# Patient Record
Sex: FEMALE | Race: WHITE | NOT HISPANIC OR LATINO | Employment: OTHER | URBAN - METROPOLITAN AREA
[De-identification: names, ages, dates, MRNs, and addresses within clinical notes are randomized per-mention and may not be internally consistent; named-entity substitution may affect disease eponyms.]

---

## 2001-08-10 LAB — HM PAP SMEAR: NORMAL

## 2017-01-20 ENCOUNTER — OFFICE VISIT (OUTPATIENT)
Dept: OBSTETRICS AND GYNECOLOGY | Facility: CLINIC | Age: 52
End: 2017-01-20

## 2017-01-20 VITALS
DIASTOLIC BLOOD PRESSURE: 60 MMHG | BODY MASS INDEX: 33.46 KG/M2 | SYSTOLIC BLOOD PRESSURE: 120 MMHG | HEIGHT: 64 IN | WEIGHT: 196 LBS

## 2017-01-20 DIAGNOSIS — N76.4 ABSCESS, VULVA: Primary | ICD-10-CM

## 2017-01-20 DIAGNOSIS — N76.4 VULVAR ABSCESS: ICD-10-CM

## 2017-01-20 DIAGNOSIS — Z13.9 SCREENING: ICD-10-CM

## 2017-01-20 LAB
BILIRUB BLD-MCNC: NEGATIVE MG/DL
CLARITY, POC: CLEAR
COLOR UR: YELLOW
GLUCOSE UR STRIP-MCNC: NEGATIVE MG/DL
KETONES UR QL: NEGATIVE
LEUKOCYTE EST, POC: NEGATIVE
NITRITE UR-MCNC: NEGATIVE MG/ML
PH UR: 6 [PH] (ref 5–8)
PROT UR STRIP-MCNC: NEGATIVE MG/DL
RBC # UR STRIP: NEGATIVE /UL
SP GR UR: 1.01 (ref 1–1.03)
UROBILINOGEN UR QL: NORMAL

## 2017-01-20 PROCEDURE — 99213 OFFICE O/P EST LOW 20 MIN: CPT | Performed by: OBSTETRICS & GYNECOLOGY

## 2017-01-20 PROCEDURE — 81002 URINALYSIS NONAUTO W/O SCOPE: CPT | Performed by: OBSTETRICS & GYNECOLOGY

## 2017-01-20 PROCEDURE — 96372 THER/PROPH/DIAG INJ SC/IM: CPT | Performed by: OBSTETRICS & GYNECOLOGY

## 2017-01-20 RX ORDER — LISINOPRIL AND HYDROCHLOROTHIAZIDE 20; 12.5 MG/1; MG/1
TABLET ORAL
COMMUNITY
Start: 2016-11-11 | End: 2019-04-17

## 2017-01-20 RX ORDER — NORGESTIMATE AND ETHINYL ESTRADIOL 7DAYSX3 28
KIT ORAL
COMMUNITY
Start: 2016-12-27 | End: 2017-04-05

## 2017-01-20 RX ORDER — CEFTRIAXONE 1 G/1
1 INJECTION, POWDER, FOR SOLUTION INTRAMUSCULAR; INTRAVENOUS ONCE
Status: COMPLETED | OUTPATIENT
Start: 2017-01-20 | End: 2017-01-20

## 2017-01-20 RX ORDER — LEVOTHYROXINE SODIUM 88 UG/1
TABLET ORAL
COMMUNITY
Start: 2016-11-09

## 2017-01-20 RX ADMIN — CEFTRIAXONE 1 G: 1 INJECTION, POWDER, FOR SOLUTION INTRAMUSCULAR; INTRAVENOUS at 15:35

## 2017-01-20 NOTE — MR AVS SNAPSHOT
Sonja Dove   1/20/2017 2:30 PM   Office Visit    Dept Phone:  [phone]   Encounter #:  [id number]    Provider:  Hakan Olsen MD   Department:  [facility]                Your Full Care Plan              Today's Medication Changes          These changes are accurate as of: 1/20/17  3:34 PM.  If you have any questions, ask your nurse or doctor.               New Medication(s)Ordered:     dicloxacillin 500 MG capsule   Commonly known as:  DYNAPEN   Take 1 capsule by mouth 4 (Four) Times a Day for 7 days.   Started by:  Hakan Olsen MD            Where to Get Your Medications      These medications were sent to [address] - [address] [place] [address] AT  [address] - [phone]  - [phone] [place] [id number]     Phone:  [phone]     dicloxacillin 500 MG capsule                  Your Updated Medication List          This list is accurate as of: 1/20/17  3:34 PM.  Always use your most recent med list.                dicloxacillin 500 MG capsule   Commonly known as:  DYNAPEN   Take 1 capsule by mouth 4 (Four) Times a Day for 7 days.       levothyroxine 88 MCG tablet   Commonly known as:  SYNTHROID, LEVOTHROID       lisinopril-hydrochlorothiazide 20-12.5 MG per tablet   Commonly known as:  PRINZIDE,ZESTORETIC       TRI-SPRINTEC 0.18/0.215/0.25 MG-35 MCG per tablet   Generic drug:  norgestimate-ethinyl estradiol               We Performed the Following     Anaerobic culture     Culture, Routine     POC Urinalysis Dipstick       You Were Diagnosed With        Codes Comments    Abscess, vulva    -  Primary ICD-10-CM: N76.4  ICD-9-CM: 616.4     Screening     ICD-10-CM: Z13.9  ICD-9-CM: V82.9     Vulvar abscess     ICD-10-CM: N76.4  ICD-9-CM: 616.4       Instructions     None    Patient Instructions History      Upcoming Appointments     Visit Type Date Time Department    GYN FOLLOW UP 1/20/2017  2:30  "PM [facility] [place]    GYN FOLLOW UP 2017  1:45 PM [facility] [place]      MyChart Signup     [facility] [employer] allows you to send messages to your doctor, view your test results, renew your prescriptions, schedule appointments, and more. To sign up, go to [employer] and click on the Sign Up Now link in the New User? box. Enter your [employer] Activation Code exactly as it appears below along with the last four digits of your Social Security Number and your Date of Birth () to complete the sign-up process. If you do not sign up before the expiration date, you must request a new code.    [employer] Activation Code: [email or web address]  Expires: 2/3/2017  3:31 PM    If you have questions, you can email [email or web address] or call [phone] to talk to our [employer] staff. Remember, [employer] is NOT to be used for urgent needs. For medical emergencies, dial 911.               Other Info from Your Visit           Your Appointments     2017  1:45 PM EST   GYN FOLLOW UP with Hakan Olsen MD   [facility] (--)    [address], [facility] [address]  [facility] [address] [phone]              Allergies     No Known Allergies      Reason for Visit     Follow-up U/S RESULT      Vital Signs     Blood Pressure Height Weight Last Menstrual Period Breastfeeding? Body Mass Index    120/60 64\" (162.6 cm) 196 lb (88.9 kg) 2016 (Exact Date) No 33.64 kg/m2    Smoking Status                   Never Smoker           Problems and Diagnoses Noted     Abscess, vulva    Screening        Vulvar abscess          Results     POC Urinalysis Dipstick      Component Value Standard Range & Units    Color Yellow Yellow, Straw, Dark Yellow, Elizabeth    Clarity, UA Clear Clear    Glucose, UA Negative Negative, 1000 mg/dL (3+) mg/dL    Bilirubin Negative Negative    Ketones, UA Negative Negative    Specific Gravity  1.010 1.005 - 1.030    Blood, UA Negative Negative    pH, " Urine 6.0 5.0 - 8.0    Protein, POC Negative Negative mg/dL    Urobilinogen, UA Normal Normal    Leukocytes Negative Negative    Nitrite, UA Negative Negative

## 2017-01-24 LAB
BACTERIA SPEC AEROBE CULT: NORMAL
BACTERIA SPEC ANAEROBE CULT: NORMAL
BACTERIA SPEC CULT: NORMAL
BACTERIA SPEC CULT: NORMAL

## 2017-01-27 ENCOUNTER — OFFICE VISIT (OUTPATIENT)
Dept: OBSTETRICS AND GYNECOLOGY | Facility: CLINIC | Age: 52
End: 2017-01-27

## 2017-01-27 VITALS
BODY MASS INDEX: 33.46 KG/M2 | SYSTOLIC BLOOD PRESSURE: 120 MMHG | DIASTOLIC BLOOD PRESSURE: 70 MMHG | WEIGHT: 196 LBS | HEIGHT: 64 IN

## 2017-01-27 DIAGNOSIS — N76.4 ABSCESS, VULVA: Primary | ICD-10-CM

## 2017-01-27 PROCEDURE — 99213 OFFICE O/P EST LOW 20 MIN: CPT | Performed by: OBSTETRICS & GYNECOLOGY

## 2017-01-27 NOTE — MR AVS SNAPSHOT
Sonja Dove   2017 1:45 PM   Office Visit    Dept Phone:  [phone]   Encounter #:  [id number]    Provider:  Hakan Olsen MD   Department:  [facility]                Your Full Care Plan              Your Updated Medication List          This list is accurate as of: 17  2:36 PM.  Always use your most recent med list.                dicloxacillin 500 MG capsule   Commonly known as:  DYNAPEN   Take 1 capsule by mouth 4 (Four) Times a Day for 7 days.       levothyroxine 88 MCG tablet   Commonly known as:  SYNTHROID, LEVOTHROID       lisinopril-hydrochlorothiazide 20-12.5 MG per tablet   Commonly known as:  PRINZIDE,ZESTORETIC       TRI-SPRINTEC 0.18/0.215/0.25 MG-35 MCG per tablet   Generic drug:  norgestimate-ethinyl estradiol               You Were Diagnosed With        Codes Comments    Abscess, vulva    -  Primary ICD-10-CM: N76.4  ICD-9-CM: 616.4       Instructions     None    Patient Instructions History      Upcoming Appointments     Visit Type Date Time Department    [facility] 2017  [facility] 3/31/2017  1:30 PM [facility]      [employer] Signup     [facility] [employer] allows you to send messages to your doctor, view your test results, renew your prescriptions, schedule appointments, and more. To sign up, go to [employer] and click on the Sign Up Now link in the New User? box. Enter your [employer] Activation Code exactly as it appears below along with the last four digits of your Social Security Number and your Date of Birth () to complete the sign-up process. If you do not sign up before the expiration date, you must request a new code.    [employer] Activation Code: [email or web address]  Expires: 2/3/2017  3:31 PM    If you have questions, you can email [email or web address] or call [phone] to talk to our [employer] staff. Remember, [employer] is NOT to be used for urgent needs.  "For medical emergencies, dial 911.               Other Info from Your Visit           Your Appointments     Mar 31, 2017  1:30 PM EDT   Annual with Hakan Olsen MD   [facility] (--)    [address] [facility], [facility] [address]  [facility] [address] [phone]              Allergies     No Known Allergies      Reason for Visit     Follow-up 1 WEEK F/U FOR ABSCESS.      Vital Signs     Blood Pressure Height Weight Last Menstrual Period Body Mass Index Smoking Status    120/70 64\" (162.6 cm) 196 lb (88.9 kg) 01/02/2016 (Exact Date) 33.64 kg/m2 Never Smoker      Problems and Diagnoses Noted     Abscess, vulva        "

## 2017-01-27 NOTE — PROGRESS NOTES
"[facility]     2017      Patient:  Sonja Dove   MR#:[id number]    Office note    CC: f/u on labial abscess    Subjective     History of Present Illness  51 y.o. female  seen last week for a periclitoral abscess.  Abscess was already spontaneously draining and the patient was started on antibiotics.  She is returns feeling much better stating complete resolution of symptoms.  She reports expressing quite a bit of purulence the days following initial exam and then symptoms premature abated by Tuesday.    In discussing future healthcare patient's advised to stop oral contraceptive pills at this time.  She will follow up in late March for routine Pap smear      Patient Active Problem List   Diagnosis   • Abscess, vulva       Past Medical History   Diagnosis Date   • Concussion      PT STATED FELL 10 FEET HIT HEAD ON ROCK AT AGE 11   • Head trauma in child 1967     AGE 2 HEAD TRAUMA   • Rh incompatibility        History reviewed. No pertinent past surgical history.    The following portions of the patient's history were reviewed and updated as appropriate: allergies, current medications, past family history, past medical history, past social history, past surgical history and problem list.    Review of Systems   Constitutional: Negative.    Respiratory: Negative.    Cardiovascular: Negative.    Gastrointestinal: Negative.    Genitourinary: Negative.    Psychiatric/Behavioral: Negative.        BP Readings from Last 3 Encounters:   17 120/70   17 120/60      Wt Readings from Last 3 Encounters:   17 196 lb (88.9 kg)   17 196 lb (88.9 kg)      BMI: Estimated body mass index is 33.64 kg/(m^2) as calculated from the following:    Height as of this encounter: 64\" (162.6 cm).    Weight as of this encounter: 196 lb (88.9 kg).  BSA: Estimated body surface area is 1.94 meters squared as calculated from the following:    Height as of this encounter: 64\" (162.6 cm).    Weight " as of this encounter: 196 lb (88.9 kg).    Objective   Physical Exam   Constitutional: She is oriented to person, place, and time. She appears well-developed and well-nourished.   Cardiovascular: Normal rate and regular rhythm.    Pulmonary/Chest: Effort normal and breath sounds normal.   Abdominal: Soft. Bowel sounds are normal. She exhibits no distension and no mass. There is no tenderness.   Genitourinary: Vagina normal and uterus normal.   Genitourinary Comments: Clitoral region appears completely normal   Neurological: She is alert and oriented to person, place, and time.   Psychiatric: She has a normal mood and affect. Her behavior is normal. Judgment and thought content normal.   Nursing note and vitals reviewed.        Assessment/Plan     1.  Periclitoral abscess-complete resolution      Hakan Olsen MD   1/27/2017 2:33 PM

## 2017-03-24 ENCOUNTER — TELEPHONE (OUTPATIENT)
Dept: OBSTETRICS AND GYNECOLOGY | Facility: CLINIC | Age: 52
End: 2017-03-24

## 2017-03-24 NOTE — TELEPHONE ENCOUNTER
----- Message from Hakan Olsen MD sent at 3/23/2017  4:23 PM EDT -----  Call patient: Normal mammogram

## 2017-04-05 ENCOUNTER — OFFICE VISIT (OUTPATIENT)
Dept: OBSTETRICS AND GYNECOLOGY | Facility: CLINIC | Age: 52
End: 2017-04-05

## 2017-04-05 VITALS
SYSTOLIC BLOOD PRESSURE: 112 MMHG | HEIGHT: 64 IN | WEIGHT: 196 LBS | DIASTOLIC BLOOD PRESSURE: 70 MMHG | BODY MASS INDEX: 33.46 KG/M2

## 2017-04-05 DIAGNOSIS — Z12.4 PAP SMEAR FOR CERVICAL CANCER SCREENING: ICD-10-CM

## 2017-04-05 DIAGNOSIS — I10 ESSENTIAL HYPERTENSION: ICD-10-CM

## 2017-04-05 DIAGNOSIS — Z01.419 WELL FEMALE EXAM WITH ROUTINE GYNECOLOGICAL EXAM: Primary | ICD-10-CM

## 2017-04-05 DIAGNOSIS — Z13.9 SCREENING: ICD-10-CM

## 2017-04-05 DIAGNOSIS — E03.9 ACQUIRED HYPOTHYROIDISM: ICD-10-CM

## 2017-04-05 PROBLEM — E66.9 OBESITY (BMI 30-39.9): Status: ACTIVE | Noted: 2017-04-05

## 2017-04-05 LAB
BILIRUB BLD-MCNC: NEGATIVE MG/DL
CLARITY, POC: CLEAR
COLOR UR: YELLOW
GLUCOSE UR STRIP-MCNC: NEGATIVE MG/DL
KETONES UR QL: NEGATIVE
LEUKOCYTE EST, POC: NEGATIVE
NITRITE UR-MCNC: NEGATIVE MG/ML
PH UR: 5.5 [PH] (ref 5–8)
PROT UR STRIP-MCNC: NEGATIVE MG/DL
RBC # UR STRIP: ABNORMAL /UL
SP GR UR: 1.01 (ref 1–1.03)
UROBILINOGEN UR QL: NORMAL

## 2017-04-05 PROCEDURE — 81002 URINALYSIS NONAUTO W/O SCOPE: CPT | Performed by: OBSTETRICS & GYNECOLOGY

## 2017-04-05 PROCEDURE — 99396 PREV VISIT EST AGE 40-64: CPT | Performed by: OBSTETRICS & GYNECOLOGY

## 2017-04-05 RX ORDER — FLUCONAZOLE 200 MG/1
200 TABLET ORAL DAILY
Qty: 4 TABLET | Refills: 0 | Status: SHIPPED | OUTPATIENT
Start: 2017-04-05 | End: 2018-04-11

## 2017-04-05 RX ORDER — ESTRADIOL AND NORETHINDRONE ACETATE 1; .5 MG/1; MG/1
1 TABLET ORAL DAILY
Qty: 90 TABLET | Refills: 3 | Status: SHIPPED | OUTPATIENT
Start: 2017-04-05 | End: 2017-05-09

## 2017-04-05 NOTE — PROGRESS NOTES
[facility]  Routine Annual Visit    2017    Patient: Sonja Dove          MR#:[id number]      History of Present Illness    51 y.o. female  who presents for annual exam.    Patient's last menstrual period was 2017.  Obstetric History:  OB History      Para Term  AB TAB SAB Ectopic Multiple Living    2 2 2       2         Menstrual History:     Patient's last menstrual period was 2017.       Sexual History:       ________________________________________  Patient Active Problem List   Diagnosis   • Abscess, vulva   • Well female exam with routine gynecological exam   • Hypertension   • Acquired hypothyroidism   • Obesity (BMI 30-39.9)       Past Medical History:   Diagnosis Date   • Concussion     PT STATED FELL 10 FEET HIT HEAD ON ROCK AT AGE 11   • Head trauma in child 1967    AGE 2 HEAD TRAUMA   • Rh incompatibility        History reviewed. No pertinent surgical history.    History   Smoking Status   • Never Smoker   Smokeless Tobacco   • Never Used       Family History   Problem Relation Age of Onset   • Hypertension Father    • Hyperlipidemia Father    • Diabetes Mother    • Hypertension Mother    • Heart defect Mother        Prior to Admission medications    Medication Sig Start Date End Date Taking? Authorizing Provider   levothyroxine (SYNTHROID, LEVOTHROID) 88 MCG tablet  16  Yes Historical Provider, MD   lisinopril-hydrochlorothiazide (PRINZIDE,ZESTORETIC) 20-12.5 MG per tablet  16  Yes Historical Provider, MD   TRI-SPRINTEC 0.18/0.215/0.25 MG-35 MCG per tablet  16  Yes Historical Provider, MD     ________________________________________    Current contraception: OCP (estrogen/progesterone)  History of abnormal Pap smear: no  Family history of uterine or ovarian cancer: no  Family History of colon cancer/colon polyps: no  History of abnormal mammogram: no  History of abnormal lipids: no    The following portions of the patient's  "history were reviewed and updated as appropriate: allergies, current medications, past family history, past medical history, past social history, past surgical history and problem list.    Review of Systems    Pertinent items are noted in HPI.     Objective   Physical Exam    /70  Ht 64\" (162.6 cm)  Wt 196 lb (88.9 kg)  LMP 03/30/2017  Breastfeeding? No  BMI 33.64 kg/m2   BP Readings from Last 3 Encounters:   04/05/17 112/70   01/27/17 120/70   01/20/17 120/60      Wt Readings from Last 3 Encounters:   04/05/17 196 lb (88.9 kg)   01/27/17 196 lb (88.9 kg)   01/20/17 196 lb (88.9 kg)        BMI: Estimated body mass index is 33.64 kg/(m^2) as calculated from the following:    Height as of this encounter: 64\" (162.6 cm).    Weight as of this encounter: 196 lb (88.9 kg).            General:   alert, appears stated age and cooperative   Heart: regular rate and rhythm, S1, S2 normal, no murmur, click, rub or gallop   Lungs: clear to auscultation bilaterally   Abdomen: soft, non-tender, without masses or organomegaly   Breast: inspection negative, no nipple discharge or bleeding, no masses or nodularity palpable   Vulva: normal   Vagina: normal mucosa   Cervix: no lesions   Uterus: normal size   Adnexa: exam limited by habitus     Assessment:    1. Normal annual exam   2.  Hypertension treated in stable  3.  Hypothyroidism treated in stable      Plan:    [x]  Rx: activella   [x]  Mammogram reviwed  []  PAP done  []  Occult fecal blood test (Insure)  []  Labs:   []  GC/Chl/TV  []  DEXA scan   [x]  Colonoscopy: 2015             Hakan Olsen MD  4/5/2017 11:15 AM  "

## 2017-04-10 ENCOUNTER — TELEPHONE (OUTPATIENT)
Dept: OBSTETRICS AND GYNECOLOGY | Facility: CLINIC | Age: 52
End: 2017-04-10

## 2017-04-10 LAB
CYTOLOGIST CVX/VAG CYTO: ABNORMAL
CYTOLOGY CVX/VAG DOC THIN PREP: ABNORMAL
DX ICD CODE: ABNORMAL
DX ICD CODE: ABNORMAL
HIV 1 & 2 AB SER-IMP: ABNORMAL
HPV I/H RISK 4 DNA CVX QL PROBE+SIG AMP: NEGATIVE
OTHER STN SPEC: ABNORMAL
PATH REPORT.FINAL DX SPEC: ABNORMAL
PATHOLOGIST CVX/VAG CYTO: ABNORMAL
STAT OF ADQ CVX/VAG CYTO-IMP: ABNORMAL

## 2017-04-10 NOTE — PROGRESS NOTES
Call patient: Pap shows atypical cells of undetermined significance with hybrid capture negative    No treatment is needed at this time.    Patient is advised to follow up annually for PAP

## 2017-04-10 NOTE — TELEPHONE ENCOUNTER
----- Message from Hakan Olsen MD sent at 4/10/2017  8:49 AM EDT -----  Call patient: Pap shows atypical cells of undetermined significance with hybrid capture negative    No treatment is needed at this time.    Patient is advised to follow up annually for PAP

## 2018-03-30 DIAGNOSIS — R92.8 ABNORMALITY OF RIGHT BREAST ON SCREENING MAMMOGRAM: Primary | ICD-10-CM

## 2018-04-11 ENCOUNTER — OFFICE VISIT (OUTPATIENT)
Dept: OBSTETRICS AND GYNECOLOGY | Age: 53
End: 2018-04-11

## 2018-04-11 VITALS
WEIGHT: 199 LBS | SYSTOLIC BLOOD PRESSURE: 110 MMHG | DIASTOLIC BLOOD PRESSURE: 70 MMHG | BODY MASS INDEX: 33.97 KG/M2 | HEIGHT: 64 IN

## 2018-04-11 DIAGNOSIS — E66.9 OBESITY (BMI 30-39.9): ICD-10-CM

## 2018-04-11 DIAGNOSIS — Z01.419 WELL FEMALE EXAM WITH ROUTINE GYNECOLOGICAL EXAM: Primary | ICD-10-CM

## 2018-04-11 DIAGNOSIS — Z12.4 PAP SMEAR FOR CERVICAL CANCER SCREENING: ICD-10-CM

## 2018-04-11 DIAGNOSIS — Z13.89 SCREENING FOR BLOOD OR PROTEIN IN URINE: ICD-10-CM

## 2018-04-11 DIAGNOSIS — I10 ESSENTIAL HYPERTENSION: ICD-10-CM

## 2018-04-11 DIAGNOSIS — R10.2 PELVIC CRAMPING: ICD-10-CM

## 2018-04-11 DIAGNOSIS — E03.9 ACQUIRED HYPOTHYROIDISM: ICD-10-CM

## 2018-04-11 PROBLEM — N76.4 ABSCESS, VULVA: Status: RESOLVED | Noted: 2017-01-20 | Resolved: 2018-04-11

## 2018-04-11 LAB
BILIRUB BLD-MCNC: NEGATIVE MG/DL
CLARITY, POC: CLEAR
COLOR UR: YELLOW
GLUCOSE UR STRIP-MCNC: NEGATIVE MG/DL
KETONES UR QL: NEGATIVE
LEUKOCYTE EST, POC: NEGATIVE
NITRITE UR-MCNC: NEGATIVE MG/ML
PH UR: 6.5 [PH] (ref 5–8)
PROT UR STRIP-MCNC: NEGATIVE MG/DL
RBC # UR STRIP: NEGATIVE /UL
SP GR UR: 1.01 (ref 1–1.03)
UROBILINOGEN UR QL: NORMAL

## 2018-04-11 PROCEDURE — 81002 URINALYSIS NONAUTO W/O SCOPE: CPT | Performed by: OBSTETRICS & GYNECOLOGY

## 2018-04-11 PROCEDURE — 99396 PREV VISIT EST AGE 40-64: CPT | Performed by: OBSTETRICS & GYNECOLOGY

## 2018-04-11 NOTE — PROGRESS NOTES
Routine Annual Visit    2018    Patient: Sonja Dove          MR#:[id number]      History of Present Illness    Chief Complaint   Patient presents with   • Gynecologic Exam     Annual.  Last pap 17, ascus/hpv negative. Last mammo 3/29/18 (NEEDS R. BREAST ADDITIONAL STUDIES)       52 y.o. female  who presents for annual exam.    Patient presents for annual exam generally reporting feeling well without complaints.  She does report some periodic lower abdominal and crampy pain and is specifically concerned about ovarian cancer area we discussed that ovarian cancers fairly insidious in its presentation and there is not a good screening test.  We discussed the SHIVA test is not endorsed as a screening tool for ovarian cancer but may have some utility in diagnosing it.  The patient wants that test today    Patient's last menstrual period was 2018 (exact date).  Obstetric History:  OB History      Para Term  AB Living    2 2 2   2    SAB TAB Ectopic Molar Multiple Live Births         2         Menstrual History:     Patient's last menstrual period was 2018 (exact date).       Sexual History:       ________________________________________  Patient Active Problem List   Diagnosis   • Acquired hypothyroidism   • Obesity (BMI 30-39.9)   • Essential hypertension   • Well female exam with routine gynecological exam       Past Medical History:   Diagnosis Date   • Abscess, vulva 2017 periclitoral     • Concussion     PT STATED FELL 10 FEET HIT HEAD ON ROCK AT AGE 11   • Head trauma in child 1967    AGE 2 HEAD TRAUMA   • Rh incompatibility        History reviewed. No pertinent surgical history.    History   Smoking Status   • Never Smoker   Smokeless Tobacco   • Never Used       Family History   Problem Relation Age of Onset   • Hypertension Father    • Hyperlipidemia Father    • Basal cell carcinoma Father    • Diabetes Mother    • Hypertension Mother    • Heart  "defect Mother    • Breast cancer Neg Hx    • Ovarian cancer Neg Hx    • Uterine cancer Neg Hx    • Colon cancer Neg Hx    • Melanoma Neg Hx    • Prostate cancer Neg Hx        Prior to Admission medications    Medication Sig Start Date End Date Taking? Authorizing Provider   estradiol (ESTRACE) 1 MG tablet Take 1 mg by mouth Daily.   Yes Historical Provider, MD   levothyroxine (SYNTHROID, LEVOTHROID) 88 MCG tablet  11/9/16  Yes Historical Provider, MD   lisinopril-hydrochlorothiazide (PRINZIDE,ZESTORETIC) 20-12.5 MG per tablet  11/11/16  Yes Historical Provider, MD   medroxyPROGESTERone (PROVERA) 2.5 MG tablet Take 2.5 mg by mouth Daily.   Yes Historical Provider, MD   fluconazole (DIFLUCAN) 200 MG tablet Take 1 tablet by mouth Daily. 4/5/17 4/11/18  Hakan Olsen MD     ________________________________________    Current contraception: post menopausal status  History of abnormal Pap smear: no  Family history of uterine or ovarian cancer: no  Family History of colon cancer/colon polyps: no  History of abnormal mammogram: yes - additional studies ordered  History of abnormal lipids: no    The following portions of the patient's history were reviewed and updated as appropriate: allergies, current medications, past family history, past medical history, past social history, past surgical history and problem list.    Review of Systems    Pertinent items are noted in HPI.     Objective   Physical Exam    /70   Ht 162.6 cm (64\")   Wt 90.3 kg (199 lb)   LMP 01/16/2018 (Exact Date)   Breastfeeding? No   BMI 34.16 kg/m²    BP Readings from Last 3 Encounters:   04/11/18 110/70   04/05/17 112/70   01/27/17 120/70      Wt Readings from Last 3 Encounters:   04/11/18 90.3 kg (199 lb)   04/05/17 88.9 kg (196 lb)   01/27/17 88.9 kg (196 lb)        BMI: Estimated body mass index is 34.16 kg/m² as calculated from the following:    Height as of this encounter: 162.6 cm (64\").    Weight as of this encounter: 90.3 kg (199 " lb).    General:   alert, appears stated age and cooperative   Heart: regular rate and rhythm, S1, S2 normal, no murmur, click, rub or gallop   Lungs: clear to auscultation bilaterally   Abdomen: soft, non-tender, without masses or organomegaly   Breast: inspection negative, no nipple discharge or bleeding, no masses or nodularity palpable   Vulva: normal   Vagina: normal mucosa   Cervix: no lesions   Uterus: normal size   Adnexa: normal adnexa     As part of wellness and prevention, the following topics were discussed with the patient:     []  Nutrition  [x]  Physical activity/regular exercise   []  Healthy weight  []  Injury prevention  []  Smoking cessation  []  Substance misuse/abuse  []  Sexual behavior  []  STD prevention  []  Contaception  []  Dental health  []  Mental health  []  Immunization  [x]  Encouraged SBE       Assessment:    Sonja was seen today for gynecologic exam.    Diagnoses and all orders for this visit:    Well female exam with routine gynecological exam    Pap smear for cervical cancer screening  -     IgP, Aptima HPV - ThinPrep Vial, Cervix    Screening for blood or protein in urine  -     POC Urinalysis Dipstick    Acquired hypothyroidism    Essential hypertension    Obesity (BMI 30-39.9)    Pelvic cramping  -     Ovarian Malignancy Risk-SHIVA; Future          Plan:  Return in about 1 year (around 4/11/2019) for Annual GYN exam.      Hakan Olsen MD  4/11/2018 10:36 AM

## 2018-04-13 LAB
CANCER AG125 SERPL-ACNC: 15 U/ML (ref 0–38.1)
HE4 SERPL-SCNC: 41 PMOL/L (ref 0–105.2)
LABORATORY COMMENT REPORT: NORMAL
POSTMENOPAUSAL INTERP: LOW: NORMAL
PREMENOPAUSAL INTERP: LOW: NORMAL
ROMA SCORE POSTMEN SERPL: 0.96
ROMA SCORE PREMEN SERPL: 0.48

## 2018-04-16 ENCOUNTER — TELEPHONE (OUTPATIENT)
Dept: OBSTETRICS AND GYNECOLOGY | Age: 53
End: 2018-04-16

## 2018-04-16 LAB
CYTOLOGIST CVX/VAG CYTO: NORMAL
CYTOLOGY CVX/VAG DOC THIN PREP: NORMAL
DX ICD CODE: NORMAL
HIV 1 & 2 AB SER-IMP: NORMAL
HPV I/H RISK 4 DNA CVX QL PROBE+SIG AMP: NEGATIVE
OTHER STN SPEC: NORMAL
PATH REPORT.FINAL DX SPEC: NORMAL
STAT OF ADQ CVX/VAG CYTO-IMP: NORMAL

## 2018-04-16 NOTE — TELEPHONE ENCOUNTER
----- Message from Hkaan Olsen MD sent at 4/16/2018  8:37 AM EDT -----  Call pt:  PAP is negative.

## 2018-04-16 NOTE — TELEPHONE ENCOUNTER
----- Message from Hakan Olsen MD sent at 4/15/2018  8:15 PM EDT -----  Notify patient: SHIVA test (risk of ovarian malignancy) is negative.  Suggesting a low probability of ovarian malignancy.

## 2018-04-16 NOTE — TELEPHONE ENCOUNTER
DR. TAYLOR,    HAD PT SCHEDULED AT [facility] FOR DIAG RIGHT MAMMO 4/16/18. SHE WENT TO REGISTER AND THEY STATED IT WOULD BE A FEW HUNDRED DOLLARS UP FRONT. PT STATES SHE HAS A HX OF DENSE BREAST AND WILL CONTINUE TO DO SELF EXAMS DAILY. STATES SHE WILL NOT BE GETTING A DIAG MAMMO AT THIS TIME. SHE STATES SHE WILL CONTACT US IF SHE HAS ANYTHING ABNORMAL COME UP.  [place]

## 2018-04-16 NOTE — PROGRESS NOTES
Notify patient: SHIVA test (risk of ovarian malignancy) is negative.  Suggesting a low probability of ovarian malignancy.

## 2018-04-17 PROBLEM — R92.8 ABNORMALITY OF RIGHT BREAST ON SCREENING MAMMOGRAM: Status: ACTIVE | Noted: 2018-04-17

## 2018-04-19 ENCOUNTER — TELEPHONE (OUTPATIENT)
Dept: OBSTETRICS AND GYNECOLOGY | Age: 53
End: 2018-04-19

## 2018-04-19 NOTE — TELEPHONE ENCOUNTER
Pt understands MD reccomendation for spot compression.Pt not aware there was a nodule in breast, she was told that they did not get a clear picture. I am calling around to see if pt can get done cheaper at another facility.Pt states if not cheaper she will bit the bullet and get the imaging done.dn

## 2018-04-19 NOTE — TELEPHONE ENCOUNTER
Pt has been scheduled a rt breast spot compression at [facility] on May 2,2018.[place]

## 2018-05-03 ENCOUNTER — TELEPHONE (OUTPATIENT)
Dept: OBSTETRICS AND GYNECOLOGY | Age: 53
End: 2018-05-03

## 2018-05-03 RX ORDER — ESTRADIOL 1 MG/1
TABLET ORAL
Qty: 90 TABLET | Refills: 3 | Status: SHIPPED | OUTPATIENT
Start: 2018-05-03 | End: 2019-04-17 | Stop reason: SDUPTHER

## 2018-05-03 RX ORDER — MEDROXYPROGESTERONE ACETATE 2.5 MG/1
TABLET ORAL
Qty: 90 TABLET | Refills: 3 | Status: SHIPPED | OUTPATIENT
Start: 2018-05-03 | End: 2019-04-17 | Stop reason: SDUPTHER

## 2018-05-03 NOTE — TELEPHONE ENCOUNTER
----- Message from Hakan Olsen MD sent at 5/3/2018  1:11 PM EDT -----  Call patient: Normal mammogram

## 2019-04-05 ENCOUNTER — TELEPHONE (OUTPATIENT)
Dept: OBSTETRICS AND GYNECOLOGY | Age: 54
End: 2019-04-05

## 2019-04-05 NOTE — TELEPHONE ENCOUNTER
----- Message from Hakan Olsen MD sent at 4/5/2019 12:57 PM EDT -----  Call patient: Normal mammogram

## 2019-04-17 ENCOUNTER — OFFICE VISIT (OUTPATIENT)
Dept: OBSTETRICS AND GYNECOLOGY | Age: 54
End: 2019-04-17

## 2019-04-17 VITALS
SYSTOLIC BLOOD PRESSURE: 112 MMHG | DIASTOLIC BLOOD PRESSURE: 78 MMHG | HEIGHT: 64 IN | BODY MASS INDEX: 31.58 KG/M2 | WEIGHT: 185 LBS

## 2019-04-17 DIAGNOSIS — I10 ESSENTIAL HYPERTENSION: ICD-10-CM

## 2019-04-17 DIAGNOSIS — L91.8 SKIN TAG: ICD-10-CM

## 2019-04-17 DIAGNOSIS — Z13.89 SCREENING FOR BLOOD OR PROTEIN IN URINE: ICD-10-CM

## 2019-04-17 DIAGNOSIS — E66.9 OBESITY (BMI 30-39.9): ICD-10-CM

## 2019-04-17 DIAGNOSIS — E03.9 ACQUIRED HYPOTHYROIDISM: ICD-10-CM

## 2019-04-17 DIAGNOSIS — Z01.419 WELL FEMALE EXAM WITH ROUTINE GYNECOLOGICAL EXAM: Primary | ICD-10-CM

## 2019-04-17 DIAGNOSIS — Z12.4 PAP SMEAR FOR CERVICAL CANCER SCREENING: ICD-10-CM

## 2019-04-17 LAB
BILIRUB BLD-MCNC: NEGATIVE MG/DL
CLARITY, POC: CLEAR
COLOR UR: YELLOW
GLUCOSE UR STRIP-MCNC: NEGATIVE MG/DL
KETONES UR QL: NEGATIVE
LEUKOCYTE EST, POC: NEGATIVE
NITRITE UR-MCNC: NEGATIVE MG/ML
PH UR: 5.5 [PH] (ref 5–8)
PROT UR STRIP-MCNC: NEGATIVE MG/DL
RBC # UR STRIP: NEGATIVE /UL
SP GR UR: 1.01 (ref 1–1.03)
UROBILINOGEN UR QL: NORMAL

## 2019-04-17 PROCEDURE — 11200 RMVL SKIN TAGS UP TO&INC 15: CPT | Performed by: OBSTETRICS & GYNECOLOGY

## 2019-04-17 PROCEDURE — 99396 PREV VISIT EST AGE 40-64: CPT | Performed by: OBSTETRICS & GYNECOLOGY

## 2019-04-17 PROCEDURE — 81002 URINALYSIS NONAUTO W/O SCOPE: CPT | Performed by: OBSTETRICS & GYNECOLOGY

## 2019-04-17 RX ORDER — ESTRADIOL 1 MG/1
1 TABLET ORAL DAILY
Qty: 90 TABLET | Refills: 3 | Status: SHIPPED | OUTPATIENT
Start: 2019-04-17 | End: 2020-05-27

## 2019-04-17 RX ORDER — MEDROXYPROGESTERONE ACETATE 2.5 MG/1
2.5 TABLET ORAL DAILY
Qty: 90 TABLET | Refills: 3 | Status: SHIPPED | OUTPATIENT
Start: 2019-04-17 | End: 2020-05-27

## 2019-04-17 RX ORDER — LISINOPRIL AND HYDROCHLOROTHIAZIDE 20; 12.5 MG/1; MG/1
1 TABLET ORAL DAILY
Qty: 30 TABLET
Start: 2019-04-17 | End: 2020-07-01

## 2019-04-17 RX ORDER — LOSARTAN POTASSIUM AND HYDROCHLOROTHIAZIDE 12.5; 5 MG/1; MG/1
TABLET ORAL
COMMUNITY
Start: 2019-01-29 | End: 2019-04-17

## 2019-04-17 NOTE — PROGRESS NOTES
Routine Annual Visit    2019    Patient: Sonja Dove          MR#:[id number]      History of Present Illness    Chief Complaint   Patient presents with   • Gynecologic Exam     Annual. Last pap 18, negative. Last mammo 4/3/19, negative.  Medications helping hot flashes.  2018 stopped b.c., 1/3/19 started cycle x 5 days, cycle around  and then on  x 6 days. None since.        53 y.o. female  who presents for annual exam.    The patient presents for routine annual exam feeling well without complaints.  She is on hormone replacement therapy and has periodic short periods but generally is feeling much better taking HRT.  The patient has had 17 pounds of planned weight loss since previous exam.      Patient's last menstrual period was 2019 (exact date).  Obstetric History:  OB History      Para Term  AB Living    2 2 2     2    SAB TAB Ectopic Molar Multiple Live Births              2         Menstrual History:     Patient's last menstrual period was 2019 (exact date).       Sexual History:       ________________________________________  Patient Active Problem List   Diagnosis   • Acquired hypothyroidism   • Obesity (BMI 30-39.9)   • Essential hypertension   • Well female exam with routine gynecological exam   • Abnormality of right breast on screening mammogram       Past Medical History:   Diagnosis Date   • Abscess, vulva 2017 periclitoral     • Concussion 1976    PT STATED FELL 10 FEET HIT HEAD ON ROCK AT AGE 11   • Head trauma in child 1967    AGE 2 HEAD TRAUMA   • Rh incompatibility        History reviewed. No pertinent surgical history.    Social History     Tobacco Use   Smoking Status Never Smoker   Smokeless Tobacco Never Used       Family History   Problem Relation Age of Onset   • Hypertension Father    • Hyperlipidemia Father    • Basal cell carcinoma Father    • Diabetes Mother    • Hypertension Mother    • Heart  "defect Mother    • Breast cancer Neg Hx    • Ovarian cancer Neg Hx    • Uterine cancer Neg Hx    • Colon cancer Neg Hx    • Melanoma Neg Hx    • Prostate cancer Neg Hx        Prior to Admission medications    Medication Sig Start Date End Date Taking? Authorizing Provider   estradiol (ESTRACE) 1 MG tablet TAKE ONE TABLET BY MOUTH EVERY MORNING WITH PROVERA 5/3/18  Yes Hakan Olsen MD   levothyroxine (SYNTHROID, LEVOTHROID) 88 MCG tablet  11/9/16  Yes Peng Weems MD   medroxyPROGESTERone (PROVERA) 2.5 MG tablet TAKE ONE TABLET BY MOUTH EVERY MORNING WITH ESTRADIOL 5/3/18  Yes Hakan Olsen MD   doxycycline (MONODOX) 100 MG capsule Take 1 capsule by mouth 2 (Two) Times a Day. 4/15/18 4/17/19  Rudolph Dove MD   guaifenesin-codeine (GUAIFENESIN AC) 100-10 MG/5ML liquid 1-2 tsp po  q6 hours prn 4/15/18 4/17/19  Rudolph Dove MD   lisinopril-hydrochlorothiazide (PRINZIDE,ZESTORETIC) 20-12.5 MG per tablet  11/11/16 4/17/19  ProviderPeng MD   losartan-hydrochlorothiazide (HYZAAR) 50-12.5 MG per tablet  1/29/19 4/17/19  Peng Weems MD   predniSONE (DELTASONE) 20 MG tablet Take 3 tablets po every morning 4/15/18 4/17/19  Rudolph Dove MD     ________________________________________    Current contraception: post menopausal status  History of abnormal Pap smear: no  Family history of uterine or ovarian cancer: no  Family History of colon cancer/colon polyps: no  History of abnormal mammogram: yes - with negative mammogram this year  History of abnormal lipids: no    The following portions of the patient's history were reviewed and updated as appropriate: allergies, current medications, past family history, past medical history, past social history, past surgical history and problem list.    Review of Systems    Pertinent items are noted in HPI.     Objective   Physical Exam   Genitourinary:             /78   Ht 162.6 cm (64\")   Wt 83.9 kg (185 lb)   LMP " "02/23/2019 (Exact Date)   Breastfeeding? No   BMI 31.76 kg/m²    BP Readings from Last 3 Encounters:   04/17/19 112/78   04/15/18 120/82   04/11/18 110/70      Wt Readings from Last 3 Encounters:   04/17/19 83.9 kg (185 lb)   04/15/18 89.8 kg (198 lb)   04/11/18 90.3 kg (199 lb)        BMI: Estimated body mass index is 31.76 kg/m² as calculated from the following:    Height as of this encounter: 162.6 cm (64\").    Weight as of this encounter: 83.9 kg (185 lb).       General: alert, appears stated age and cooperative   Heart: regular rate and rhythm, S1, S2 normal, no murmur, click, rub or gallop   Lungs: clear to auscultation bilaterally   Abdomen: soft, non-tender, without masses, no organomegaly   Breast: inspection negative, no nipple discharge or bleeding, no masses or nodularity palpable   Vulva: normal, bartholin's, Urethra, [place]'s normal and mild atrophy   Vagina: normal mucosa, normal discharge   Cervix: no lesions   Uterus: normal size   Adnexa: exam limited by habitus     Procedure Note     The area denoted is cleaned well with betadine and injected with 4 cc of 1% lidocaine with epinephrine.    The area is incised with #11 blade    The procedure is well tolerated by the patient               As part of wellness and prevention, the following topics were discussed with the patient:     []  Nutrition  []  Physical activity/regular exercise   []  Healthy weight  []  Injury prevention  []  Smoking cessation  []  Substance misuse/abuse  []  Sexual behavior  []  STD prevention  []  Contaception  []  Dental health  []  Mental health  []  Immunization  [x]  Encouraged SBE        Assessment:    Sonja was seen today for gynecologic exam.    Diagnoses and all orders for this visit:    Well female exam with routine gynecological exam  -     IgP, Aptima HPV    Pap smear for cervical cancer screening  -     IgP, Aptima HPV    Screening for blood or protein in urine  -     POC Urinalysis Dipstick    Acquired " hypothyroidism    Obesity (BMI 30-39.9)    Essential hypertension    Skin tag  - lesion is bothersome with atypical features including irregular discoloration and irregular borders.    - Removal is requested by the patient  - See notes above     Other orders  -     lisinopril-hydrochlorothiazide (PRINZIDE,ZESTORETIC) 20-12.5 MG per tablet; Take 1 tablet by mouth Daily.  -     estradiol (ESTRACE) 1 MG tablet; Take 1 tablet by mouth Daily.  -     medroxyPROGESTERone (PROVERA) 2.5 MG tablet; Take 1 tablet by mouth Daily.          Plan:  Return in about 1 year (around 4/17/2020) for Annual GYN exam.      Hakan Olsen MD  4/26/2019 6:47 AM

## 2019-04-22 ENCOUNTER — TELEPHONE (OUTPATIENT)
Dept: OBSTETRICS AND GYNECOLOGY | Age: 54
End: 2019-04-22

## 2019-04-22 NOTE — TELEPHONE ENCOUNTER
----- Message from Hakan Olsen MD sent at 4/19/2019  5:03 PM EDT -----  Call pt:  PAP is negative.

## 2020-05-13 ENCOUNTER — TELEPHONE (OUTPATIENT)
Dept: OBSTETRICS AND GYNECOLOGY | Age: 55
End: 2020-05-13

## 2020-05-13 NOTE — TELEPHONE ENCOUNTER
----- Message from Hakan Olsen MD sent at 5/13/2020  8:40 AM EDT -----  Call patient: Normal mammogram

## 2020-05-27 RX ORDER — MEDROXYPROGESTERONE ACETATE 2.5 MG/1
TABLET ORAL
Qty: 90 TABLET | Refills: 2 | Status: SHIPPED | OUTPATIENT
Start: 2020-05-27 | End: 2021-02-17

## 2020-05-27 RX ORDER — ESTRADIOL 1 MG/1
TABLET ORAL
Qty: 90 TABLET | Refills: 2 | Status: SHIPPED | OUTPATIENT
Start: 2020-05-27 | End: 2021-02-17

## 2020-07-01 ENCOUNTER — OFFICE VISIT (OUTPATIENT)
Dept: OBSTETRICS AND GYNECOLOGY | Age: 55
End: 2020-07-01

## 2020-07-01 VITALS
HEIGHT: 64 IN | SYSTOLIC BLOOD PRESSURE: 110 MMHG | DIASTOLIC BLOOD PRESSURE: 76 MMHG | BODY MASS INDEX: 32.95 KG/M2 | WEIGHT: 193 LBS

## 2020-07-01 DIAGNOSIS — E03.9 ACQUIRED HYPOTHYROIDISM: ICD-10-CM

## 2020-07-01 DIAGNOSIS — I10 ESSENTIAL HYPERTENSION: ICD-10-CM

## 2020-07-01 DIAGNOSIS — Z01.419 WELL FEMALE EXAM WITH ROUTINE GYNECOLOGICAL EXAM: Primary | ICD-10-CM

## 2020-07-01 DIAGNOSIS — Z11.51 SPECIAL SCREENING EXAMINATION FOR HUMAN PAPILLOMAVIRUS (HPV): ICD-10-CM

## 2020-07-01 DIAGNOSIS — Z12.4 ROUTINE CERVICAL SMEAR: ICD-10-CM

## 2020-07-01 DIAGNOSIS — E66.9 OBESITY (BMI 30-39.9): ICD-10-CM

## 2020-07-01 PROBLEM — R92.8 ABNORMALITY OF RIGHT BREAST ON SCREENING MAMMOGRAM: Status: RESOLVED | Noted: 2018-04-17 | Resolved: 2020-07-01

## 2020-07-01 PROCEDURE — 99396 PREV VISIT EST AGE 40-64: CPT | Performed by: OBSTETRICS & GYNECOLOGY

## 2020-07-01 RX ORDER — LOSARTAN POTASSIUM AND HYDROCHLOROTHIAZIDE 12.5; 5 MG/1; MG/1
TABLET ORAL
COMMUNITY
Start: 2020-05-06 | End: 2020-07-01

## 2020-07-01 NOTE — PROGRESS NOTES
Routine Annual Visit    2020    Patient: Sonja Dove          MR#:[id number]      History of Present Illness    Chief Complaint   Patient presents with   • Gynecologic Exam     Last pap 2019, last MG 2020       54 y.o. female  who presents for annual exam.    Presents for routine annual exam feeling well without complaints.  The patient is happy with her current estrogen replacement regimen        No LMP recorded (lmp unknown). Patient is perimenopausal.  Obstetric History:  OB History        2    Para   2    Term   2            AB        Living   2       SAB        TAB        Ectopic        Molar        Multiple        Live Births   2               Menstrual History:     No LMP recorded (lmp unknown). Patient is perimenopausal.       Sexual History:       ________________________________________  Patient Active Problem List   Diagnosis   • Acquired hypothyroidism   • Obesity (BMI 30-39.9)   • Essential hypertension   • Well female exam with routine gynecological exam       Past Medical History:   Diagnosis Date   • Abscess, vulva 2017 periclitoral     • Concussion     PT STATED FELL 10 FEET HIT HEAD ON ROCK AT AGE 11   • Head trauma in child 1967    AGE 2 HEAD TRAUMA   • Rh incompatibility        History reviewed. No pertinent surgical history.    Social History     Tobacco Use   Smoking Status Never Smoker   Smokeless Tobacco Never Used       Family History   Problem Relation Age of Onset   • Hypertension Father    • Hyperlipidemia Father    • Basal cell carcinoma Father    • Diabetes Mother    • Hypertension Mother    • Heart defect Mother    • Breast cancer Neg Hx    • Ovarian cancer Neg Hx    • Uterine cancer Neg Hx    • Colon cancer Neg Hx    • Melanoma Neg Hx    • Prostate cancer Neg Hx        Prior to Admission medications    Medication Sig Start Date End Date Taking? Authorizing Provider   estradiol (ESTRACE) 1 MG tablet TAKE ONE TABLET BY MOUTH DAILY  "5/27/20  Yes Hakan Olsen MD   levothyroxine (SYNTHROID, LEVOTHROID) 88 MCG tablet  11/9/16  Yes ProviderPeng MD   medroxyPROGESTERone (PROVERA) 2.5 MG tablet TAKE ONE TABLET BY MOUTH DAILY 5/27/20  Yes Hakan Olsen MD   lisinopril-hydrochlorothiazide (PRINZIDE,ZESTORETIC) 20-12.5 MG per tablet Take 1 tablet by mouth Daily. 4/17/19 7/1/20  Hakan Olsen MD   losartan-hydrochlorothiazide (HYZAAR) 50-12.5 MG per tablet  5/6/20 7/1/20  ProviderPeng MD     ________________________________________    Current contraception: post menopausal status  History of abnormal Pap smear: no  Family history of uterine or ovarian cancer: no  Family History of colon cancer/colon polyps: no  History of abnormal mammogram: yes - 2018 with resolution of abnormality   History of abnormal lipids: no    The following portions of the patient's history were reviewed and updated as appropriate: allergies, current medications, past family history, past medical history, past social history, past surgical history and problem list.    Review of Systems    Pertinent items are noted in HPI.     Objective   Physical Exam    /76   Ht 162.6 cm (64\")   Wt 87.5 kg (193 lb)   LMP  (LMP Unknown)   Breastfeeding No   BMI 33.13 kg/m²    BP Readings from Last 3 Encounters:   07/01/20 110/76   04/17/19 112/78   04/15/18 120/82      Wt Readings from Last 3 Encounters:   07/01/20 87.5 kg (193 lb)   04/17/19 83.9 kg (185 lb)   04/15/18 89.8 kg (198 lb)        BMI: Estimated body mass index is 33.13 kg/m² as calculated from the following:    Height as of this encounter: 162.6 cm (64\").    Weight as of this encounter: 87.5 kg (193 lb).       General: alert, appears stated age and cooperative   Heart: regular rate and rhythm, S1, S2 normal, no murmur, click, rub or gallop   Lungs: clear to auscultation bilaterally   Abdomen: soft, non-tender, without masses, no organomegaly   Breast: inspection negative, no nipple discharge or " bleeding, no masses or nodularity palpable   Vulva: normal and bartholin's, Urethra, [place]'s normal   Vagina: normal mucosa, normal discharge   Cervix: no lesions   Uterus: normal size   Adnexa: exam limited by habitus     As part of wellness and prevention, the following topics were discussed with the patient:  Encouraged self breast exam  Physical activity and regular exercised encouraged.           Assessment:    Sonja was seen today for gynecologic exam.    Diagnoses and all orders for this visit:    Well female exam with routine gynecological exam    Acquired hypothyroidism    Essential hypertension    Obesity (BMI 30-39.9)          Plan:  Return in about 1 year (around 7/1/2021) for Annual GYN exam.      Hakan Olsen MD  7/1/2020 13:16

## 2020-07-08 ENCOUNTER — TELEPHONE (OUTPATIENT)
Dept: OBSTETRICS AND GYNECOLOGY | Age: 55
End: 2020-07-08

## 2020-07-08 LAB
CYTOLOGIST CVX/VAG CYTO: NORMAL
CYTOLOGY CVX/VAG DOC CYTO: NORMAL
CYTOLOGY CVX/VAG DOC THIN PREP: NORMAL
DX ICD CODE: NORMAL
HIV 1 & 2 AB SER-IMP: NORMAL
HPV I/H RISK 4 DNA CVX QL PROBE+SIG AMP: NEGATIVE
OTHER STN SPEC: NORMAL
STAT OF ADQ CVX/VAG CYTO-IMP: NORMAL

## 2020-08-18 ENCOUNTER — TELEPHONE (OUTPATIENT)
Dept: OBSTETRICS AND GYNECOLOGY | Age: 55
End: 2020-08-18

## 2020-08-18 NOTE — TELEPHONE ENCOUNTER
Per  pt will need an us with visit. Diflucan 200 mg I po Q day. Called into [facility]  And pt notified.dn

## 2020-08-18 NOTE — TELEPHONE ENCOUNTER
Patient called and stated that she has a yeast infection and would like for you to call in Diflucan to her pharmacy.  She also states that she is having post menopausal bleeding.  I have her scheduled for an appointment 08/25/20.  Do I need to schedule an ultrasound with this visit?  Please advise.

## 2020-08-25 ENCOUNTER — OFFICE VISIT (OUTPATIENT)
Dept: OBSTETRICS AND GYNECOLOGY | Age: 55
End: 2020-08-25

## 2020-08-25 ENCOUNTER — PROCEDURE VISIT (OUTPATIENT)
Dept: OBSTETRICS AND GYNECOLOGY | Age: 55
End: 2020-08-25

## 2020-08-25 VITALS
SYSTOLIC BLOOD PRESSURE: 112 MMHG | DIASTOLIC BLOOD PRESSURE: 72 MMHG | BODY MASS INDEX: 32.61 KG/M2 | WEIGHT: 191 LBS | HEIGHT: 64 IN

## 2020-08-25 DIAGNOSIS — D25.1 FIBROIDS, INTRAMURAL: ICD-10-CM

## 2020-08-25 DIAGNOSIS — Z79.890 POSTMENOPAUSAL HORMONE REPLACEMENT THERAPY: Primary | ICD-10-CM

## 2020-08-25 DIAGNOSIS — E66.9 OBESITY (BMI 30-39.9): ICD-10-CM

## 2020-08-25 DIAGNOSIS — N95.0 PMB (POSTMENOPAUSAL BLEEDING): Primary | ICD-10-CM

## 2020-08-25 PROCEDURE — 76830 TRANSVAGINAL US NON-OB: CPT | Performed by: OBSTETRICS & GYNECOLOGY

## 2020-08-25 PROCEDURE — 99213 OFFICE O/P EST LOW 20 MIN: CPT | Performed by: OBSTETRICS & GYNECOLOGY

## 2020-08-25 RX ORDER — LOSARTAN POTASSIUM 25 MG/1
25 TABLET ORAL DAILY
COMMUNITY
End: 2022-07-20

## 2020-08-25 NOTE — PROGRESS NOTES
2020      Patient:  Sonja Dove   MR#:[id number]    Office note    Chief Complaint   Patient presents with   • Gynecologic Exam     cc: PMB / us today , last pap 20 neg , last mmg  , denies any mood swings or hot flashes , blood work was done 20 and progesterone was on the lower side pt have a copy of results with her .        Subjective     History of Present Illness  55 y.o. female  presents with complaint of intermittent mild episodes of postmenopausal bleeding over the last month or so.  The bleeding is now completely abated and the patient is feeling well.  The patient had routine labs done by her primary care showing that her progesterone was low and her estrogen was in the lower range.    Ultrasound today shows a small uterus with endometrial lining measuring 2.8 mm.  The patient has multiple small fibroids in the 2 cm range that predominantly appear intramural.  The patient is taking hormone replacement therapy and has been so for several years.  The patient reports missing medication last month around the time that she had bleeding.        Patient Active Problem List   Diagnosis   • Acquired hypothyroidism   • Obesity (BMI 30-39.9)   • Essential hypertension   • Well female exam with routine gynecological exam   • Fibroids, intramural   • Postmenopausal hormone replacement therapy   • PMB (postmenopausal bleeding)       Past Medical History:   Diagnosis Date   • Abscess, vulva 2017 periclitoral     • Concussion     PT STATED FELL 10 FEET HIT HEAD ON ROCK AT AGE 11   • Head trauma in child 1967    AGE 2 HEAD TRAUMA   • Rh incompatibility        History reviewed. No pertinent surgical history.    Obstetric History:  OB History        2    Para   2    Term   2            AB        Living   2       SAB        TAB        Ectopic        Molar        Multiple        Live Births   2               Menstrual History:     No LMP recorded (lmp  unknown). Patient is perimenopausal.       #: 1, Date: 1993, Sex: Male, Weight: 3147 g (6 lb 15 oz), GA: 40w0d, Delivery: Vaginal, Spontaneous, Apgar1: None, Apgar5: None, Living: Living, Birth Comments: None    #: 2, Date: 1997, Sex: Female, Weight: 3629 g (8 lb), GA: 40w0d, Delivery: Vaginal, Spontaneous, Apgar1: None, Apgar5: None, Living: Living, Birth Comments: None      Family History   Problem Relation Age of Onset   • Hypertension Father    • Hyperlipidemia Father    • Basal cell carcinoma Father    • Diabetes Mother    • Hypertension Mother    • Heart defect Mother    • Breast cancer Neg Hx    • Ovarian cancer Neg Hx    • Uterine cancer Neg Hx    • Colon cancer Neg Hx    • Melanoma Neg Hx    • Prostate cancer Neg Hx        Social History     Tobacco Use   • Smoking status: Never Smoker   • Smokeless tobacco: Never Used   Substance Use Topics   • Alcohol use: No   • Drug use: No       Patient has no known allergies.      Current Outpatient Medications:   •  estradiol (ESTRACE) 1 MG tablet, TAKE ONE TABLET BY MOUTH DAILY, Disp: 90 tablet, Rfl: 2  •  levothyroxine (SYNTHROID, LEVOTHROID) 88 MCG tablet, , Disp: , Rfl:   •  losartan (COZAAR) 25 MG tablet, Take 25 mg by mouth Daily., Disp: , Rfl:   •  medroxyPROGESTERone (PROVERA) 2.5 MG tablet, TAKE ONE TABLET BY MOUTH DAILY, Disp: 90 tablet, Rfl: 2    The following portions of the patient's history were reviewed and updated as appropriate: allergies, current medications, past family history, past medical history, past social history, past surgical history and problem list.    Review of Systems   Constitutional: Negative.    Respiratory: Negative.    Cardiovascular: Negative.    Gastrointestinal: Negative.    Genitourinary: Negative.         Postmenopausal bleeding   Psychiatric/Behavioral: Negative.        BP Readings from Last 3 Encounters:   08/25/20 112/72   07/01/20 110/76   04/17/19 112/78      Wt Readings from Last 3 Encounters:   08/25/20 86.6 kg (191  "lb)   07/01/20 87.5 kg (193 lb)   04/17/19 83.9 kg (185 lb)      BMI: Estimated body mass index is 32.79 kg/m² as calculated from the following:    Height as of this encounter: 162.6 cm (64\").    Weight as of this encounter: 86.6 kg (191 lb).  BSA: Estimated body surface area is 1.92 meters squared as calculated from the following:    Height as of this encounter: 162.6 cm (64\").    Weight as of this encounter: 86.6 kg (191 lb).    Objective   Physical Exam   Constitutional: She is oriented to person, place, and time. She appears well-developed and well-nourished.   HENT:   Head: Normocephalic and atraumatic.   Cardiovascular: Normal rate.   Pulmonary/Chest: Effort normal.   Abdominal: Soft. Bowel sounds are normal. She exhibits no distension. There is no tenderness.   Genitourinary:   Genitourinary Comments: No bleeding today    Neurological: She is alert and oriented to person, place, and time.   Skin: Skin is warm and dry.   Psychiatric: She has a normal mood and affect. Her behavior is normal. Judgment and thought content normal.   Nursing note and vitals reviewed.      Assessment/Plan     Sonja was seen today for gynecologic exam.    Diagnoses and all orders for this visit:    Postmenopausal hormone replacement therapy    Obesity (BMI 30-39.9)    Fibroids, intramural        We had a lengthy discussion regarding hormone replacement in the setting of menopause.  We discussed that it is quite common to have periodic spotting on hormone replacement.  The patient's work-up is normal with her endometrial lining appearing atrophic.  The patient has multiple small fibroids some of which may be submucosal that are likely contributing.  We discussed that missing doses of HRT will provoke bleeding.  Additionally we discussed that synthetic progesterone would not show up on the progesterone assay and that she has ovarian failure so the level was expected.  The patient otherwise feels well and is very happy on the " hormone    Replacement regimen and does not desire to change things.  I advised her there is no evidence of pathology at this time and follow-up for her annual exam or problematic heavy bleeding.      No follow-ups on file.        Hakan Olsen MD   8/25/2020 09:38

## 2021-02-10 RX ORDER — FLUCONAZOLE 200 MG/1
TABLET ORAL
Qty: 3 TABLET | Refills: 0 | OUTPATIENT
Start: 2021-02-10 | End: 2021-09-30

## 2021-02-17 RX ORDER — ESTRADIOL 1 MG/1
TABLET ORAL
Qty: 90 TABLET | Refills: 1 | Status: SHIPPED | OUTPATIENT
Start: 2021-02-17 | End: 2021-07-14 | Stop reason: SDUPTHER

## 2021-02-17 RX ORDER — MEDROXYPROGESTERONE ACETATE 2.5 MG/1
TABLET ORAL
Qty: 90 TABLET | Refills: 1 | Status: SHIPPED | OUTPATIENT
Start: 2021-02-17 | End: 2021-07-14 | Stop reason: SDUPTHER

## 2021-03-30 ENCOUNTER — BULK ORDERING (OUTPATIENT)
Dept: CASE MANAGEMENT | Facility: OTHER | Age: 56
End: 2021-03-30

## 2021-03-30 DIAGNOSIS — Z23 IMMUNIZATION DUE: ICD-10-CM

## 2021-07-14 ENCOUNTER — OFFICE VISIT (OUTPATIENT)
Dept: OBSTETRICS AND GYNECOLOGY | Age: 56
End: 2021-07-14

## 2021-07-14 VITALS
BODY MASS INDEX: 33.12 KG/M2 | SYSTOLIC BLOOD PRESSURE: 110 MMHG | DIASTOLIC BLOOD PRESSURE: 70 MMHG | WEIGHT: 194 LBS | HEIGHT: 64 IN

## 2021-07-14 DIAGNOSIS — Z11.51 SCREENING FOR HPV (HUMAN PAPILLOMAVIRUS): ICD-10-CM

## 2021-07-14 DIAGNOSIS — Z01.419 WELL FEMALE EXAM WITH ROUTINE GYNECOLOGICAL EXAM: Primary | ICD-10-CM

## 2021-07-14 DIAGNOSIS — Z12.4 SCREENING FOR MALIGNANT NEOPLASM OF THE CERVIX: ICD-10-CM

## 2021-07-14 DIAGNOSIS — B37.2 CUTANEOUS CANDIDIASIS: ICD-10-CM

## 2021-07-14 PROCEDURE — 99396 PREV VISIT EST AGE 40-64: CPT | Performed by: OBSTETRICS & GYNECOLOGY

## 2021-07-14 RX ORDER — MEDROXYPROGESTERONE ACETATE 2.5 MG/1
2.5 TABLET ORAL DAILY
Qty: 90 TABLET | Refills: 4 | Status: SHIPPED | OUTPATIENT
Start: 2021-07-14 | End: 2022-07-20 | Stop reason: SDUPTHER

## 2021-07-14 RX ORDER — TERBINAFINE HYDROCHLORIDE 250 MG/1
250 TABLET ORAL DAILY
COMMUNITY
End: 2021-12-06

## 2021-07-14 RX ORDER — ESTRADIOL 1 MG/1
1 TABLET ORAL DAILY
Qty: 90 TABLET | Refills: 4 | Status: SHIPPED | OUTPATIENT
Start: 2021-07-14 | End: 2022-07-20 | Stop reason: SDUPTHER

## 2021-07-14 NOTE — PROGRESS NOTES
Routine Annual Visit    2021    Patient: Sonja Dove          MR#:[id number]    History of Present Illness    Chief Complaint   Patient presents with   • Gynecologic Exam     cc: annual visit today , last pap 20 neg , mmg 21 @ [facility] , doing well with estradiol nd progesterone - denies any vag bleeding or hot flashes , having rush under both breasts since last july - lamisil , no other gyno complaints , colonoscopy  due        55 y.o. female  who presents for annual exam.    The patient presents feeling well without major complaints.  She is concerned about a rash underneath her breasts related to chronic cutaneous yeast.  The patient was placed on Lamisil by her primary care provider but has found the oral therapy mostly ineffective.    Studies reviewed:    No LMP recorded (lmp unknown). Patient is perimenopausal.  Obstetric History:  OB History        2    Para   2    Term   2            AB        Living   2       SAB        TAB        Ectopic        Molar        Multiple        Live Births   2               Menstrual History:     No LMP recorded (lmp unknown). Patient is perimenopausal.       Sexual History:       ________________________________________  Patient Active Problem List   Diagnosis   • Acquired hypothyroidism   • Obesity (BMI 30-39.9)   • Essential hypertension   • Well female exam with routine gynecological exam   • Fibroids, intramural   • Postmenopausal hormone replacement therapy   • PMB (postmenopausal bleeding)     Past Medical History:   Diagnosis Date   • Abscess, vulva 2017 periclitoral     • Concussion     PT STATED FELL 10 FEET HIT HEAD ON ROCK AT AGE 11   • Head trauma in child 1967    AGE 2 HEAD TRAUMA   • Rh incompatibility      History reviewed. No pertinent surgical history.  Social History     Tobacco Use   Smoking Status Never Smoker   Smokeless Tobacco Never Used     Family History   Problem Relation Age  "of Onset   • Hypertension Father    • Hyperlipidemia Father    • Basal cell carcinoma Father    • Diabetes Mother    • Hypertension Mother    • Heart defect Mother    • Breast cancer Neg Hx    • Ovarian cancer Neg Hx    • Uterine cancer Neg Hx    • Colon cancer Neg Hx    • Melanoma Neg Hx    • Prostate cancer Neg Hx      Prior to Admission medications    Medication Sig Start Date End Date Taking? Authorizing Provider   estradiol (ESTRACE) 1 MG tablet TAKE ONE TABLET BY MOUTH DAILY 2/17/21  Yes Hakan Olsen MD   fluconazole (DIFLUCAN) 200 MG tablet TAKE ONE TABLET BY MOUTH DAILY FOR 3 DAYS 2/10/21  Yes Hakan Olsen MD   levothyroxine (SYNTHROID, LEVOTHROID) 88 MCG tablet  11/9/16  Yes Peng Weems MD   losartan (COZAAR) 25 MG tablet Take 25 mg by mouth Daily.   Yes Peng Weems MD   medroxyPROGESTERone (PROVERA) 2.5 MG tablet TAKE ONE TABLET BY MOUTH DAILY 2/17/21  Yes Hakan Olsen MD   terbinafine (lamiSIL) 250 MG tablet Take 250 mg by mouth Daily.   Yes Peng Weems MD     ________________________________________    Current contraception: post menopausal status  History of abnormal Pap smear: no  Family history of uterine or ovarian cancer: no  Family History of colon cancer/colon polyps: no  History of abnormal mammogram: no  History of abnormal lipids: no    The following portions of the patient's history were reviewed and updated as appropriate: allergies, current medications, past family history, past medical history, past social history, past surgical history and problem list.    Review of Systems    Pertinent items are noted in HPI.       Objective   Physical Exam    /70   Ht 162.6 cm (64\")   Wt 88 kg (194 lb)   LMP  (LMP Unknown)   Breastfeeding No   BMI 33.30 kg/m²    BP Readings from Last 3 Encounters:   07/14/21 110/70   08/25/20 112/72   07/01/20 110/76      Wt Readings from Last 3 Encounters:   07/14/21 88 kg (194 lb)   08/25/20 86.6 kg (191 lb)   07/01/20 " "87.5 kg (193 lb)        BMI: Estimated body mass index is 33.3 kg/m² as calculated from the following:    Height as of this encounter: 162.6 cm (64\").    Weight as of this encounter: 88 kg (194 lb).       General: alert, appears stated age and cooperative   Heart: regular rate and rhythm, S1, S2 normal, no murmur, click, rub or gallop   Lungs: clear to auscultation bilaterally   Abdomen: soft, non-tender, without masses, no organomegaly   Breast: inspection negative, no nipple discharge or bleeding, no masses or nodularity palpable   External genitalia/Vulva: External genitalia including bartholin's glands, Urethra, [place]'s gland and urethra meatus are normal, Perineum, rectum and anus appear normal  and Bladder appears normal without significant prolapse    Vagina: normal mucosa, normal discharge   Cervix: no lesions   Uterus: normal size, mobile and non-tender   Adnexa: normal adnexa     As part of wellness and prevention, the following topics were discussed with the patient:  Encouraged self breast exam  Physical activity and regular exercised encouraged.       Assessment:    Diagnoses and all orders for this visit:    1. Well female exam with routine gynecological exam (Primary)  -     IgP, Aptima HPV    2. Screening for malignant neoplasm of the cervix  -     IgP, Aptima HPV    3. Screening for HPV (human papillomavirus)  -     IgP, Aptima HPV    4. Cutaneous candidiasis    Other orders  -     estradiol (ESTRACE) 1 MG tablet; Take 1 tablet by mouth Daily.  Dispense: 90 tablet; Refill: 4  -     medroxyPROGESTERone (PROVERA) 2.5 MG tablet; Take 1 tablet by mouth Daily.  Dispense: 90 tablet; Refill: 4        Plan:  Return in about 1 year (around 7/14/2022) for Annual GYN exam.      Hakan Olsen MD  7/14/2021 13:52 EDT  "

## 2021-12-04 ENCOUNTER — DOCUMENTATION (OUTPATIENT)
Dept: OBSTETRICS AND GYNECOLOGY | Age: 56
End: 2021-12-04

## 2021-12-04 DIAGNOSIS — B37.2 CUTANEOUS CANDIDIASIS: Primary | ICD-10-CM

## 2021-12-06 RX ORDER — FLUCONAZOLE 200 MG/1
200 TABLET ORAL EVERY OTHER DAY
Qty: 7 TABLET | Refills: 0 | Status: SHIPPED | OUTPATIENT
Start: 2021-12-06 | End: 2022-07-20

## 2022-02-09 ENCOUNTER — OFFICE VISIT (OUTPATIENT)
Dept: OBSTETRICS AND GYNECOLOGY | Age: 57
End: 2022-02-09

## 2022-02-09 VITALS
SYSTOLIC BLOOD PRESSURE: 128 MMHG | DIASTOLIC BLOOD PRESSURE: 74 MMHG | HEIGHT: 64 IN | WEIGHT: 193 LBS | BODY MASS INDEX: 32.95 KG/M2

## 2022-02-09 DIAGNOSIS — Z13.89 SCREENING FOR BLOOD OR PROTEIN IN URINE: ICD-10-CM

## 2022-02-09 DIAGNOSIS — B37.2 CANDIDIASIS, CUTANEOUS: Primary | ICD-10-CM

## 2022-02-09 LAB
BILIRUB BLD-MCNC: NEGATIVE MG/DL
GLUCOSE UR STRIP-MCNC: NEGATIVE MG/DL
KETONES UR QL: NEGATIVE
LEUKOCYTE EST, POC: NEGATIVE
NITRITE UR-MCNC: NEGATIVE MG/ML
PH UR: 8.5 [PH] (ref 5–8)
PROT UR STRIP-MCNC: NEGATIVE MG/DL
RBC # UR STRIP: NEGATIVE /UL
SP GR UR: 1.02 (ref 1–1.03)
UROBILINOGEN UR QL: NORMAL

## 2022-02-09 PROCEDURE — 99213 OFFICE O/P EST LOW 20 MIN: CPT | Performed by: OBSTETRICS & GYNECOLOGY

## 2022-02-09 PROCEDURE — 81002 URINALYSIS NONAUTO W/O SCOPE: CPT | Performed by: OBSTETRICS & GYNECOLOGY

## 2022-02-09 NOTE — PROGRESS NOTES
[facility]   Obstetrics and Gynecology     2022      Patient:  Sonja Dove   MR#:[id number]    Office note    Chief Complaint   Patient presents with   • Breast Problem     yeast under breast        Subjective     History of Present Illness  56 y.o. female  presents with complaint of itchy red rash underneath her breasts that is been much worse over the last month.  I spoke to the patient by phone and suggested she start using Goldbond to the focal area and marked improvement has since noted.  The patient returns today with some continued mild symptoms of cutaneous yeast in the area underneath her breast and between her breast in the middle    Relevant data reviewed:      Patient Active Problem List   Diagnosis   • Acquired hypothyroidism   • Obesity (BMI 30-39.9)   • Essential hypertension   • Well female exam with routine gynecological exam   • Fibroids, intramural   • Postmenopausal hormone replacement therapy   • PMB (postmenopausal bleeding)       Past Medical History:   Diagnosis Date   • Abscess, vulva 2017 periclitoral     • Concussion     PT STATED FELL 10 FEET HIT HEAD ON ROCK AT AGE 11   • Head trauma in child 1967    AGE 2 HEAD TRAUMA   • Rh incompatibility      No past surgical history on file.  Obstetric History:  OB History        2    Para   2    Term   2            AB        Living   2       SAB        IAB        Ectopic        Molar        Multiple        Live Births   2               Menstrual History:     No LMP recorded (lmp unknown). Patient is perimenopausal.       # 1 - Date: , Sex: Male, Weight: 3147 g (6 lb 15 oz), GA: 40w0d, Delivery: Vaginal, Spontaneous, Apgar1: None, Apgar5: None, Living: Living, Birth Comments: None    # 2 - Date: , Sex: Female, Weight: 3629 g (8 lb), GA: 40w0d, Delivery: Vaginal, Spontaneous, Apgar1: None, Apgar5: None, Living: Living, Birth Comments: None    Family History   Problem Relation Age  of Onset   • Hypertension Father    • Hyperlipidemia Father    • Basal cell carcinoma Father    • Diabetes Mother    • Hypertension Mother    • Heart defect Mother    • Breast cancer Neg Hx    • Ovarian cancer Neg Hx    • Uterine cancer Neg Hx    • Colon cancer Neg Hx    • Melanoma Neg Hx    • Prostate cancer Neg Hx      Social History     Tobacco Use   • Smoking status: Never Smoker   • Smokeless tobacco: Never Used   Vaping Use   • Vaping Use: Never used   Substance Use Topics   • Alcohol use: No   • Drug use: No     Patient has no known allergies.    Current Outpatient Medications:   •  estradiol (ESTRACE) 1 MG tablet, Take 1 tablet by mouth Daily., Disp: 90 tablet, Rfl: 4  •  levothyroxine (SYNTHROID, LEVOTHROID) 88 MCG tablet, , Disp: , Rfl:   •  losartan (COZAAR) 25 MG tablet, Take 25 mg by mouth Daily., Disp: , Rfl:   •  losartan-hydrochlorothiazide (HYZAAR) 50-12.5 MG per tablet, , Disp: , Rfl:   •  medroxyPROGESTERone (PROVERA) 2.5 MG tablet, Take 1 tablet by mouth Daily., Disp: 90 tablet, Rfl: 4  •  fluconazole (Diflucan) 200 MG tablet, Take 1 tablet by mouth Every Other Day., Disp: 7 tablet, Rfl: 0    The following portions of the patient's history were reviewed and updated as appropriate: allergies, current medications, past family history, past medical history, past social history, past surgical history and problem list.    Review of Systems   Constitutional: Negative.    Respiratory: Negative.    Cardiovascular: Negative.    Gastrointestinal: Negative.    Genitourinary: Negative.    Skin: Positive for rash.        Focal itching     Psychiatric/Behavioral: Negative.        BP Readings from Last 3 Encounters:   02/09/22 128/74   09/30/21 128/76   07/14/21 110/70      Wt Readings from Last 3 Encounters:   02/09/22 87.5 kg (193 lb)   09/30/21 88 kg (194 lb)   07/14/21 88 kg (194 lb)      BMI: Estimated body mass index is 33.13 kg/m² as calculated from the following:    Height as of this encounter: 162.6 cm  "(64\").    Weight as of this encounter: 87.5 kg (193 lb). BSA: Estimated body surface area is 1.93 meters squared as calculated from the following:    Height as of this encounter: 162.6 cm (64\").    Weight as of this encounter: 87.5 kg (193 lb).    Objective   Physical Exam  Chest:             Assessment/Plan     Diagnoses and all orders for this visit:    1. Candidiasis, cutaneous (Primary)    2. Screening for blood or protein in urine  -     POC Urinalysis Dipstick        Genitan Violet applied to the area above.  Extra product given to the patient for application at home.  Patient is instructed to continue to keep the area dry and use Goldbond for wicking the moisture out of the focal area.        No follow-ups on file.    Hakan Olsen MD   2/9/2022 08:47 EST  "

## 2022-05-02 ENCOUNTER — OFFICE VISIT (OUTPATIENT)
Dept: OBSTETRICS AND GYNECOLOGY | Age: 57
End: 2022-05-02

## 2022-05-02 VITALS
DIASTOLIC BLOOD PRESSURE: 76 MMHG | SYSTOLIC BLOOD PRESSURE: 118 MMHG | HEIGHT: 64 IN | WEIGHT: 196.2 LBS | BODY MASS INDEX: 33.49 KG/M2

## 2022-05-02 DIAGNOSIS — N90.89 VULVAR IRRITATION: Primary | ICD-10-CM

## 2022-05-02 PROCEDURE — 99213 OFFICE O/P EST LOW 20 MIN: CPT | Performed by: NURSE PRACTITIONER

## 2022-05-02 NOTE — PROGRESS NOTES
Subjective   Sonja Dove is a 56 y.o. female.     History of Present Illness     Sonja presents with complaint of vulvar skin change and irritation.    No sure how long symptoms have been present- recently noticed some mild irritation/burning in area of concern which prompted her to look. She reports skin in between vagina and perineum is somewhat white appearing.     She has been seeing dermatology for inflammatory skin rash under her breasts- autoimmune in nature per patient and controlled with topical steroids.     The following portions of the patient's history were reviewed and updated as appropriate: allergies, current medications, past family history, past medical history, past social history, past surgical history and problem list.    Review of Systems   Constitutional: Negative for chills, fatigue and fever.   Gastrointestinal: Negative for abdominal distention and abdominal pain.   Genitourinary: Negative for decreased urine volume, difficulty urinating, dyspareunia, dysuria, frequency, genital sores, hematuria, menstrual problem, pelvic pain, pelvic pressure, urgency, urinary incontinence, vaginal bleeding, vaginal discharge and vaginal pain.       Objective   Physical Exam  Constitutional:       Appearance: Normal appearance. She is not ill-appearing.   Genitourinary:     Labia:         Right: No tenderness, lesion or injury.         Left: No tenderness, lesion or injury.        Skin:     General: Skin is warm and dry.   Neurological:      General: No focal deficit present.      Mental Status: She is alert and oriented to person, place, and time.   Psychiatric:         Mood and Affect: Mood normal.         Behavior: Behavior normal.             Assessment/Plan   Diagnoses and all orders for this visit:    1. Vulvar irritation (Primary)      Counseled on differentials- lichen sclerosis vs atrophic changes. Recommend further evaluation and possible biopsy with Dr. Olsen. Also recommend she show her  dermatologist at upcoming visit. She understands and agrees with plan    DAMIAN Baptiste

## 2022-07-20 ENCOUNTER — OFFICE VISIT (OUTPATIENT)
Dept: OBSTETRICS AND GYNECOLOGY | Age: 57
End: 2022-07-20

## 2022-07-20 VITALS
WEIGHT: 197 LBS | BODY MASS INDEX: 33.63 KG/M2 | DIASTOLIC BLOOD PRESSURE: 74 MMHG | SYSTOLIC BLOOD PRESSURE: 120 MMHG | HEIGHT: 64 IN

## 2022-07-20 DIAGNOSIS — N90.5 ATROPHIC VULVA: ICD-10-CM

## 2022-07-20 DIAGNOSIS — Z01.419 WELL WOMAN EXAM WITH ROUTINE GYNECOLOGICAL EXAM: Primary | ICD-10-CM

## 2022-07-20 DIAGNOSIS — Z13.89 SCREENING FOR BLOOD OR PROTEIN IN URINE: ICD-10-CM

## 2022-07-20 DIAGNOSIS — Z12.4 SCREENING FOR MALIGNANT NEOPLASM OF THE CERVIX: ICD-10-CM

## 2022-07-20 DIAGNOSIS — N90.4 LICHEN SCLEROSUS OF FEMALE GENITALIA: ICD-10-CM

## 2022-07-20 DIAGNOSIS — Z11.51 SPECIAL SCREENING EXAMINATION FOR HUMAN PAPILLOMAVIRUS (HPV): ICD-10-CM

## 2022-07-20 PROCEDURE — 99396 PREV VISIT EST AGE 40-64: CPT | Performed by: OBSTETRICS & GYNECOLOGY

## 2022-07-20 PROCEDURE — 99213 OFFICE O/P EST LOW 20 MIN: CPT | Performed by: OBSTETRICS & GYNECOLOGY

## 2022-07-20 RX ORDER — CLOBETASOL PROPIONATE 0.5 MG/G
1 CREAM TOPICAL 2 TIMES DAILY
Qty: 60 G | Refills: 4 | Status: SHIPPED | OUTPATIENT
Start: 2022-07-20

## 2022-07-20 RX ORDER — ESTRADIOL 0.1 MG/G
2 CREAM VAGINAL DAILY
Qty: 42.5 G | Refills: 12 | Status: SHIPPED | OUTPATIENT
Start: 2022-07-20

## 2022-07-20 RX ORDER — ESTRADIOL 1 MG/1
1 TABLET ORAL DAILY
Qty: 90 TABLET | Refills: 4 | Status: SHIPPED | OUTPATIENT
Start: 2022-07-20 | End: 2022-11-16

## 2022-07-20 RX ORDER — FLUCONAZOLE 200 MG/1
200 TABLET ORAL DAILY
Qty: 4 TABLET | Refills: 4 | Status: SHIPPED | OUTPATIENT
Start: 2022-07-20 | End: 2022-11-16

## 2022-07-20 RX ORDER — MEDROXYPROGESTERONE ACETATE 2.5 MG/1
2.5 TABLET ORAL DAILY
Qty: 90 TABLET | Refills: 4 | Status: SHIPPED | OUTPATIENT
Start: 2022-07-20 | End: 2022-10-11

## 2022-07-20 NOTE — PROGRESS NOTES
[facility]   Obstetrics and Gynecology     2022    Patient: Sonja Dove          MR#:[id number]    History of Present Illness    Chief Complaint   Patient presents with   • Gynecologic Exam     last pap 2021 neg, last mg 2022       56 y.o. female  who presents for annual exam.    The patient presents generally feeling well without major complaints.  She complains of a focal area on her vulva that appears more white that is offered some periodic irritation and discomfort.    Studies reviewed:    No LMP recorded (lmp unknown). Patient is postmenopausal.  Obstetric History:  OB History        2    Para   2    Term   2            AB        Living   2       SAB        IAB        Ectopic        Molar        Multiple        Live Births   2               Menstrual History:     No LMP recorded (lmp unknown). Patient is postmenopausal.       Sexual History:       Social History     Substance and Sexual Activity   Sexual Activity Not Currently   • Partners: Male   • Birth control/protection: Post-menopausal     ______________________________________  Patient Active Problem List   Diagnosis   • Acquired hypothyroidism   • Obesity (BMI 30-39.9)   • Essential hypertension   • Fibroids, intramural   • Postmenopausal hormone replacement therapy   • PMB (postmenopausal bleeding)   • Lichen sclerosus of female genitalia     Past Medical History:   Diagnosis Date   • Abscess, vulva 2017 periclitoral     • Concussion     PT STATED FELL 10 FEET HIT HEAD ON ROCK AT AGE 11   • Head trauma in child 1967    AGE 2 HEAD TRAUMA   • Rh incompatibility      No past surgical history on file.  Social History     Tobacco Use   Smoking Status Never Smoker   Smokeless Tobacco Never Used     Family History   Problem Relation Age of Onset   • Hypertension Father    • Hyperlipidemia Father    • Basal cell carcinoma Father    • Diabetes Mother    • Hypertension Mother    • Heart defect  "Mother    • Breast cancer Neg Hx    • Ovarian cancer Neg Hx    • Uterine cancer Neg Hx    • Colon cancer Neg Hx    • Melanoma Neg Hx    • Prostate cancer Neg Hx      Prior to Admission medications    Medication Sig Start Date End Date Taking? Authorizing Provider   estradiol (ESTRACE) 1 MG tablet Take 1 tablet by mouth Daily. 7/14/21  Yes Hakan Olsen MD   levothyroxine (SYNTHROID, LEVOTHROID) 88 MCG tablet  11/9/16  Yes ProviderPeng MD   losartan-hydrochlorothiazide (HYZAAR) 50-12.5 MG per tablet  7/30/21  Yes Emergency, Nurse Epic, RN   medroxyPROGESTERone (PROVERA) 2.5 MG tablet Take 1 tablet by mouth Daily. 7/14/21  Yes Hakan Olsen MD   clobetasol (Temovate) 0.05 % cream Apply 1 application topically to the appropriate area as directed 2 (Two) Times a Day. 7/20/22   Hakan Olsen MD   estradiol (ESTRACE VAGINAL) 0.1 MG/GM vaginal cream Insert 2 g into the vagina Daily. 7/20/22   Hakan Olsen MD   fluconazole (Diflucan) 200 MG tablet Take 1 tablet by mouth Every Other Day. 12/6/21 7/20/22  Hakan Olsen MD   losartan (COZAAR) 25 MG tablet Take 25 mg by mouth Daily.  7/20/22  ProviderPeng MD     _______________________________________    Current contraception: post menopausal status  History of abnormal Pap smear: no  Family history of uterine or ovarian cancer: no  Family History of colon cancer/colon polyps: no  History of abnormal mammogram: no  History of abnormal lipids: no    The following portions of the patient's history were reviewed and updated as appropriate: allergies, current medications, past family history, past medical history, past social history, past surgical history and problem list.    Review of Systems    Pertinent items are noted in HPI.       Objective   Physical Exam    /74   Ht 162.6 cm (64\")   Wt 89.4 kg (197 lb)   LMP  (LMP Unknown)   Breastfeeding No   BMI 33.81 kg/m²    BP Readings from Last 3 Encounters:   07/20/22 120/74   05/02/22 118/76 " "  02/09/22 128/74      Wt Readings from Last 3 Encounters:   07/20/22 89.4 kg (197 lb)   05/02/22 89 kg (196 lb 3.2 oz)   02/09/22 87.5 kg (193 lb)        BMI: Estimated body mass index is 33.81 kg/m² as calculated from the following:    Height as of this encounter: 162.6 cm (64\").    Weight as of this encounter: 89.4 kg (197 lb).       General: alert, appears stated age and cooperative   Heart: regular rate and rhythm, S1, S2 normal, no murmur, click, rub or gallop   Lungs: clear to auscultation bilaterally   Abdomen: soft, non-tender, without masses, no organomegaly   Breast: inspection negative, no nipple discharge or bleeding, no masses or nodularity palpable   External genitalia/Vulva: Lichen sclerosis - see photo, moderate atrophy, External genitalia including bartholin's glands, Urethra, [place]'s gland and urethra meatus are normal, Perineum, rectum and anus appear normal  and Bladder appears normal without significant prolapse    Vagina: normal mucosa, normal discharge, atrophic appearance    Cervix: no lesions   Uterus: normal size, mobile and non-tender   Adnexa: normal adnexa and no mass, fullness, tenderness     As part of wellness and prevention, the following topics were discussed with the patient:  Encouraged self breast exam  Physical activity and regular exercised encouraged.           Assessment:  Diagnoses and all orders for this visit:    1. Well woman exam with routine gynecological exam (Primary)  -     IgP, Aptima HPV    2. Screening for malignant neoplasm of the cervix  -     IgP, Aptima HPV    3. Special screening examination for human papillomavirus (HPV)  -     IgP, Aptima HPV    4. Screening for blood or protein in urine  -     POC Urinalysis Dipstick    5. Lichen sclerosus of female genitalia  -     clobetasol (Temovate) 0.05 % cream; Apply 1 application topically to the appropriate area as directed 2 (Two) Times a Day.  Dispense: 60 g; Refill: 4    6. Atrophic vulva  -     estradiol " (ESTRACE VAGINAL) 0.1 MG/GM vaginal cream; Insert 2 g into the vagina Daily.  Dispense: 42.5 g; Refill: 12    Other orders  -     fluconazole (Diflucan) 200 MG tablet; Take 1 tablet by mouth Daily.  Dispense: 4 tablet; Refill: 4      Plan:  Return in about 4 months (around 11/20/2022) for Recheck.    Hakan Olsen MD  7/20/2022 14:14 EDT

## 2022-07-25 LAB
CYTOLOGIST CVX/VAG CYTO: NORMAL
CYTOLOGY CVX/VAG DOC CYTO: NORMAL
CYTOLOGY CVX/VAG DOC THIN PREP: NORMAL
DX ICD CODE: NORMAL
HIV 1 & 2 AB SER-IMP: NORMAL
HPV I/H RISK 4 DNA CVX QL PROBE+SIG AMP: NEGATIVE
Lab: NORMAL
OTHER STN SPEC: NORMAL
STAT OF ADQ CVX/VAG CYTO-IMP: NORMAL

## 2022-09-15 ENCOUNTER — OFFICE VISIT (OUTPATIENT)
Dept: OBSTETRICS AND GYNECOLOGY | Age: 57
End: 2022-09-15

## 2022-09-15 VITALS
WEIGHT: 198 LBS | SYSTOLIC BLOOD PRESSURE: 112 MMHG | DIASTOLIC BLOOD PRESSURE: 62 MMHG | HEIGHT: 64 IN | BODY MASS INDEX: 33.8 KG/M2

## 2022-09-15 DIAGNOSIS — N90.4 LICHEN SCLEROSUS OF FEMALE GENITALIA: ICD-10-CM

## 2022-09-15 DIAGNOSIS — D25.0 FIBROIDS, SUBMUCOSAL: ICD-10-CM

## 2022-09-15 DIAGNOSIS — R93.89 ENDOMETRIAL THICKENING ON ULTRASOUND: ICD-10-CM

## 2022-09-15 DIAGNOSIS — N95.0 PMB (POSTMENOPAUSAL BLEEDING): Primary | ICD-10-CM

## 2022-09-15 PROCEDURE — 99214 OFFICE O/P EST MOD 30 MIN: CPT | Performed by: NURSE PRACTITIONER

## 2022-09-15 PROCEDURE — 58100 BIOPSY OF UTERUS LINING: CPT | Performed by: NURSE PRACTITIONER

## 2022-09-15 NOTE — PROGRESS NOTES
[facility]   Obstetrics and Gynecology     9/15/2022      Patient:  Sonja Dove   MR#:[id number]    Office note    Chief Complaint   Patient presents with   • Gynecologic Exam     PMB and cramping, u/s today       Subjective     History of Present Illness  57 y.o. female  presents for evaluation of postmenopausal bleeding.  She has had postmenopausal bleeding before when she skipped doses of her HRT.  In , she had evaluation for PMB which was benign, endometrial thickness on ultrasound was 2 mm.  Ultrasound today shows retroverted uterus with multiple fibroids, largest measuring 2.5 cm.  Endometrial thickness 11 mm.  She denies any breast or vaginal complaints today she does have lichen sclerosus which she has been taking clobetasol for since .        Relevant data reviewed:      Patient Active Problem List   Diagnosis   • Acquired hypothyroidism   • Obesity (BMI 30-39.9)   • Essential hypertension   • Fibroids, intramural   • Postmenopausal hormone replacement therapy   • PMB (postmenopausal bleeding)   • Lichen sclerosus of female genitalia   • Fibroids, submucosal   • Endometrial thickening on ultrasound       Past Medical History:   Diagnosis Date   • Abscess, vulva 2017 periclitoral     • Concussion 1976    PT STATED FELL 10 FEET HIT HEAD ON ROCK AT AGE 11   • Head trauma in child 1967    AGE 2 HEAD TRAUMA   • Rh incompatibility      History reviewed. No pertinent surgical history.  Obstetric History:  OB History        2    Para   2    Term   2            AB        Living   2       SAB        IAB        Ectopic        Molar        Multiple        Live Births   2               Menstrual History:     No LMP recorded (lmp unknown). Patient is postmenopausal.       # 1 - Date: , Sex: Male, Weight: 3147 g (6 lb 15 oz), GA: 40w0d, Delivery: Vaginal, Spontaneous, Apgar1: None, Apgar5: None, Living: Living, Birth Comments: None    # 2 - Date: ,  Sex: Female, Weight: 3629 g (8 lb), GA: 40w0d, Delivery: Vaginal, Spontaneous, Apgar1: None, Apgar5: None, Living: Living, Birth Comments: None    Family History   Problem Relation Age of Onset   • Hypertension Father    • Hyperlipidemia Father    • Basal cell carcinoma Father    • Diabetes Mother    • Hypertension Mother    • Heart defect Mother    • Breast cancer Neg Hx    • Ovarian cancer Neg Hx    • Uterine cancer Neg Hx    • Colon cancer Neg Hx    • Melanoma Neg Hx    • Prostate cancer Neg Hx      Social History     Tobacco Use   • Smoking status: Never Smoker   • Smokeless tobacco: Never Used   Vaping Use   • Vaping Use: Never used   Substance Use Topics   • Alcohol use: No   • Drug use: No     Patient has no known allergies.    Current Outpatient Medications:   •  clobetasol (Temovate) 0.05 % cream, Apply 1 application topically to the appropriate area as directed 2 (Two) Times a Day., Disp: 60 g, Rfl: 4  •  estradiol (ESTRACE VAGINAL) 0.1 MG/GM vaginal cream, Insert 2 g into the vagina Daily., Disp: 42.5 g, Rfl: 12  •  estradiol (ESTRACE) 1 MG tablet, Take 1 tablet by mouth Daily., Disp: 90 tablet, Rfl: 4  •  fluconazole (Diflucan) 200 MG tablet, Take 1 tablet by mouth Daily., Disp: 4 tablet, Rfl: 4  •  levothyroxine (SYNTHROID, LEVOTHROID) 88 MCG tablet, , Disp: , Rfl:   •  losartan-hydrochlorothiazide (HYZAAR) 50-12.5 MG per tablet, , Disp: , Rfl:   •  medroxyPROGESTERone (PROVERA) 2.5 MG tablet, Take 1 tablet by mouth Daily., Disp: 90 tablet, Rfl: 4    The following portions of the patient's history were reviewed and updated as appropriate: allergies, current medications, past family history, past medical history, past social history, past surgical history, and problem list.    Review of Systems   Constitutional: Negative for activity change, appetite change, chills, fatigue and fever.   Respiratory: Negative for cough and shortness of breath.    Cardiovascular: Negative for chest pain.  "  Gastrointestinal: Negative for constipation, diarrhea, nausea and vomiting.   Genitourinary: Positive for menstrual problem and vaginal bleeding. Negative for dysuria, flank pain, genital sores and hematuria.       BP Readings from Last 3 Encounters:   09/15/22 112/62   07/20/22 120/74   05/02/22 118/76      Wt Readings from Last 3 Encounters:   09/15/22 89.8 kg (198 lb)   07/20/22 89.4 kg (197 lb)   05/02/22 89 kg (196 lb 3.2 oz)      BMI: Estimated body mass index is 33.99 kg/m² as calculated from the following:    Height as of this encounter: 162.6 cm (64\").    Weight as of this encounter: 89.8 kg (198 lb). BSA: Estimated body surface area is 1.95 meters squared as calculated from the following:    Height as of this encounter: 162.6 cm (64\").    Weight as of this encounter: 89.8 kg (198 lb).    Objective   Physical Exam  Constitutional:       Appearance: Normal appearance.   HENT:      Head: Normocephalic and atraumatic.   Eyes:      Pupils: Pupils are equal, round, and reactive to light.   Pulmonary:      Effort: Pulmonary effort is normal.   Abdominal:      General: Abdomen is flat.      Palpations: Abdomen is soft.   Genitourinary:     General: Normal vulva.      Exam position: Lithotomy position.      Labia:         Right: No rash, tenderness or lesion.         Left: No rash, tenderness or lesion.       Vagina: Normal.      Cervix: Normal.      Uterus: Normal.       Adnexa: Right adnexa normal and left adnexa normal.      Rectum: Normal.       Musculoskeletal:         General: Normal range of motion.      Cervical back: Normal range of motion.   Skin:     General: Skin is warm and dry.   Neurological:      Mental Status: She is alert.   Psychiatric:         Mood and Affect: Mood normal.         Behavior: Behavior normal.     Endometrial Biopsy    Date of procedure:  9/15/2022    Procedure documentation:    The cervix was grasped anterior at the 1 o'clock and 11 o'clock position.  The cavity sounded to 7 " centimeters.  An endometrial biopsy specimen was obtained and multiple passes.  The tissue was sent for permanent histopathologic evaluation.  Tenaculum was removed from the cervix with scant bleeding.    Post procedure instructions: She was instructed to call us in 1 week's time if she has not heard from us otherwise.  If there is any significant fever or excessive bleeding or pain she is to call immediately.              Assessment & Plan     Diagnoses and all orders for this visit:    1. PMB (postmenopausal bleeding) (Primary)    2. Endometrial thickening on ultrasound    3. Lichen sclerosus of female genitalia  Continue clobetasol as prescribed  4. Fibroids, submucosal         Return in about 2 weeks (around 9/29/2022) for for discussion of treatment of thickened endometrium with Dr. Olsen.    Gracie Montalvo, DAMIAN   9/15/2022 15:04 EDT

## 2022-09-21 ENCOUNTER — DOCUMENTATION (OUTPATIENT)
Dept: OBSTETRICS AND GYNECOLOGY | Age: 57
End: 2022-09-21

## 2022-09-21 ENCOUNTER — TELEPHONE (OUTPATIENT)
Dept: OBSTETRICS AND GYNECOLOGY | Age: 57
End: 2022-09-21

## 2022-09-21 DIAGNOSIS — C54.1 PAPILLARY SEROUS ADENOCARCINOMA OF ENDOMETRIUM: Primary | ICD-10-CM

## 2022-09-21 LAB
DX ICD CODE: NORMAL
DX ICD CODE: NORMAL
PATH REPORT.FINAL DX SPEC: NORMAL
PATH REPORT.GROSS SPEC: NORMAL
PATH REPORT.SITE OF ORIGIN SPEC: NORMAL
PATHOLOGIST NAME: NORMAL
PAYMENT PROCEDURE: NORMAL

## 2022-09-21 NOTE — TELEPHONE ENCOUNTER
Provider: DR TAYLOR  Caller: CECE HULL  Relationship to Patient: SELF   Reason for Call: PATIENT CALLED TO CONFIRM IF LABS WERE BACK FROM BIOPSY COMPLETED 9/15/22.    PLEASE CALL PATIENT ONCE RECEIVED.

## 2022-09-22 NOTE — PROGRESS NOTES
[facility]   Obstetrics and Gynecology     9/21/2022      Patient:  Sonja Dove   MR#:[id number]    Chart note    Discussed endometrial biopsy results.      p53 positive epithelium concerning for a papillary serous lesion.      Amb ref to GYN ONC made      Reference Histopathology (09/15/2022 16:23)      Hakan Olsen MD   9/21/2022 20:42 EDT

## 2022-10-11 ENCOUNTER — TELEPHONE (OUTPATIENT)
Dept: OBSTETRICS AND GYNECOLOGY | Age: 57
End: 2022-10-11

## 2022-10-11 NOTE — TELEPHONE ENCOUNTER
Patient would like for you to give her a call about her surgery.  She has some questions before meeting with oncologist tomorrow.

## 2022-11-16 ENCOUNTER — OFFICE VISIT (OUTPATIENT)
Dept: OBSTETRICS AND GYNECOLOGY | Age: 57
End: 2022-11-16

## 2022-11-16 VITALS
HEIGHT: 64 IN | BODY MASS INDEX: 34.15 KG/M2 | SYSTOLIC BLOOD PRESSURE: 122 MMHG | DIASTOLIC BLOOD PRESSURE: 74 MMHG | WEIGHT: 200 LBS

## 2022-11-16 DIAGNOSIS — N90.4 LICHEN SCLEROSUS OF FEMALE GENITALIA: ICD-10-CM

## 2022-11-16 DIAGNOSIS — N90.5 ATROPHIC VULVA: Primary | ICD-10-CM

## 2022-11-16 DIAGNOSIS — Z13.89 SCREENING FOR BLOOD OR PROTEIN IN URINE: ICD-10-CM

## 2022-11-16 LAB
BILIRUB BLD-MCNC: NEGATIVE MG/DL
CLARITY, POC: CLEAR
COLOR UR: YELLOW
GLUCOSE UR STRIP-MCNC: NEGATIVE MG/DL
KETONES UR QL: NEGATIVE
LEUKOCYTE EST, POC: NEGATIVE
NITRITE UR-MCNC: NEGATIVE MG/ML
PH UR: 5 [PH] (ref 5–8)
PROT UR STRIP-MCNC: NEGATIVE MG/DL
RBC # UR STRIP: NEGATIVE /UL
SP GR UR: 1.01 (ref 1–1.03)
UROBILINOGEN UR QL: NORMAL

## 2022-11-16 PROCEDURE — 99213 OFFICE O/P EST LOW 20 MIN: CPT | Performed by: OBSTETRICS & GYNECOLOGY

## 2022-11-16 PROCEDURE — 81002 URINALYSIS NONAUTO W/O SCOPE: CPT | Performed by: OBSTETRICS & GYNECOLOGY

## 2022-11-16 RX ORDER — MEDROXYPROGESTERONE ACETATE 2.5 MG/1
2.5 TABLET ORAL DAILY
COMMUNITY
End: 2022-11-16

## 2022-11-16 NOTE — PROGRESS NOTES
[facility]   Obstetrics and Gynecology     2022      Patient:  Sonja Dove   MR#:[id number]    Office note    Chief Complaint   Patient presents with   • Follow-up     Pt here to f/u for white patch between vagina and anus. 2 months PO hysterectomy with Dr. Johns, neg for cancer. But now dealing with hot flashes. She was told she should not continue taking hormones.        Subjective     History of Present Illness  57 y.o. female  presents for follow-up on severe atrophic vulvitis with focal area suspicious for lichen sclerosis who reports marked improvement with application of topical estrogen cream and Temovate.  The patient had hysterectomy by Dr. 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  Final path returned negative.  The patient is healed uneventfully and reports feeling well    The patient reports the focal area on her vulva previously discussed is much improved.        Relevant data reviewed:      Patient Active Problem List   Diagnosis   • Acquired hypothyroidism   • Obesity (BMI 30-39.9)   • Essential hypertension   • Fibroids, intramural   • Postmenopausal hormone replacement therapy   • PMB (postmenopausal bleeding)   • Lichen sclerosus of female genitalia   • Fibroids, submucosal   • Endometrial thickening on ultrasound       Past Medical History:   Diagnosis Date   • Abscess, vulva 2017 periclitoral     • Concussion     PT STATED FELL 10 FEET HIT HEAD ON ROCK AT AGE 11   • Head trauma in child 1967    AGE 2 HEAD TRAUMA   • Rh incompatibility      No past surgical history on file.  Obstetric History:  OB History        2    Para   2    Term   2            AB        Living   2       SAB        IAB        Ectopic        Molar        Multiple        Live Births   2               Menstrual History:     No LMP recorded (lmp unknown). Patient is postmenopausal.       # 1 - Date: , Sex: Male, Weight: 3147 g (6 lb 15  oz), GA: 40w0d, Delivery: Vaginal, Spontaneous, Apgar1: None, Apgar5: None, Living: Living, Birth Comments: None    # 2 - Date: 1997, Sex: Female, Weight: 3629 g (8 lb), GA: 40w0d, Delivery: Vaginal, Spontaneous, Apgar1: None, Apgar5: None, Living: Living, Birth Comments: None    Family History   Problem Relation Age of Onset   • Hypertension Father    • Hyperlipidemia Father    • Basal cell carcinoma Father    • Diabetes Mother    • Hypertension Mother    • Heart defect Mother    • Breast cancer Neg Hx    • Ovarian cancer Neg Hx    • Uterine cancer Neg Hx    • Colon cancer Neg Hx    • Melanoma Neg Hx    • Prostate cancer Neg Hx      Social History     Tobacco Use   • Smoking status: Never   • Smokeless tobacco: Never   Vaping Use   • Vaping Use: Never used   Substance Use Topics   • Alcohol use: No   • Drug use: No     Patient has no known allergies.    Current Outpatient Medications:   •  clobetasol (Temovate) 0.05 % cream, Apply 1 application topically to the appropriate area as directed 2 (Two) Times a Day., Disp: 60 g, Rfl: 4  •  levothyroxine (SYNTHROID, LEVOTHROID) 88 MCG tablet, , Disp: , Rfl:   •  losartan-hydrochlorothiazide (HYZAAR) 50-12.5 MG per tablet, , Disp: , Rfl:   •  estradiol (ESTRACE VAGINAL) 0.1 MG/GM vaginal cream, Insert 2 g into the vagina Daily., Disp: 42.5 g, Rfl: 12  •  estradiol (ESTRACE) 1 MG tablet, Take 1 tablet by mouth Daily., Disp: 90 tablet, Rfl: 4  •  medroxyPROGESTERone (PROVERA) 2.5 MG tablet, Take 2.5 mg by mouth Daily., Disp: , Rfl:     The following portions of the patient's history were reviewed and updated as appropriate: allergies, current medications, past family history, past medical history, past social history, past surgical history and problem list.    Review of Systems   Constitutional: Negative.    Respiratory: Negative.    Cardiovascular: Negative.    Gastrointestinal: Negative.    Genitourinary: Negative.    Psychiatric/Behavioral: Negative.        BP Readings  "from Last 3 Encounters:   11/16/22 122/74   09/15/22 112/62   07/20/22 120/74      Wt Readings from Last 3 Encounters:   11/16/22 90.7 kg (200 lb)   09/15/22 89.8 kg (198 lb)   07/20/22 89.4 kg (197 lb)      BMI: Estimated body mass index is 34.33 kg/m² as calculated from the following:    Height as of this encounter: 162.6 cm (64\").    Weight as of this encounter: 90.7 kg (200 lb). BSA: Estimated body surface area is 1.96 meters squared as calculated from the following:    Height as of this encounter: 162.6 cm (64\").    Weight as of this encounter: 90.7 kg (200 lb).    Objective   Physical Exam  Vitals and nursing note reviewed.   Constitutional:       Appearance: She is well-developed.   HENT:      Head: Normocephalic and atraumatic.   Cardiovascular:      Rate and Rhythm: Normal rate.   Pulmonary:      Effort: Pulmonary effort is normal.   Abdominal:      General: Bowel sounds are normal. There is no distension.      Palpations: Abdomen is soft.      Tenderness: There is no abdominal tenderness.   Genitourinary:     Comments: Vulva shows mild diffuse atrophy but marked improvement in focal area between the vaginal fourchette and the rectum.  Skin:     General: Skin is warm and dry.   Neurological:      Mental Status: She is alert and oriented to person, place, and time.   Psychiatric:         Behavior: Behavior normal.         Thought Content: Thought content normal.         Judgment: Judgment normal.         Assessment & Plan     Diagnoses and all orders for this visit:    1. Atrophic vulva (Primary)    2. Screening for blood or protein in urine  -     POC Urinalysis Dipstick    3. Lichen sclerosus of female genitalia          No follow-ups on file.    Hakan Olsen MD   11/16/2022 10:28 EST  "

## 2023-11-22 ENCOUNTER — OFFICE VISIT (OUTPATIENT)
Dept: OBSTETRICS AND GYNECOLOGY | Age: 58
End: 2023-11-22
Payer: COMMERCIAL

## 2023-11-22 VITALS
WEIGHT: 200 LBS | HEIGHT: 64 IN | SYSTOLIC BLOOD PRESSURE: 120 MMHG | DIASTOLIC BLOOD PRESSURE: 74 MMHG | BODY MASS INDEX: 34.15 KG/M2

## 2023-11-22 DIAGNOSIS — N90.4 LICHEN SCLEROSUS OF FEMALE GENITALIA: ICD-10-CM

## 2023-11-22 DIAGNOSIS — Z11.51 SCREENING FOR HUMAN PAPILLOMAVIRUS (HPV): ICD-10-CM

## 2023-11-22 DIAGNOSIS — Z12.4 SCREENING FOR MALIGNANT NEOPLASM OF CERVIX: ICD-10-CM

## 2023-11-22 DIAGNOSIS — Z01.419 WELL FEMALE EXAM WITH ROUTINE GYNECOLOGICAL EXAM: Primary | ICD-10-CM

## 2023-11-22 DIAGNOSIS — I10 ESSENTIAL HYPERTENSION: ICD-10-CM

## 2023-11-22 PROBLEM — Z79.890 POSTMENOPAUSAL HORMONE REPLACEMENT THERAPY: Status: RESOLVED | Noted: 2020-08-25 | Resolved: 2023-11-22

## 2023-11-22 PROBLEM — E66.9 OBESITY (BMI 30-39.9): Status: RESOLVED | Noted: 2017-04-05 | Resolved: 2023-11-22

## 2023-11-22 PROBLEM — D25.0 FIBROIDS, SUBMUCOSAL: Status: RESOLVED | Noted: 2022-09-15 | Resolved: 2023-11-22

## 2023-11-22 PROBLEM — R93.89 ENDOMETRIAL THICKENING ON ULTRASOUND: Status: RESOLVED | Noted: 2022-09-15 | Resolved: 2023-11-22

## 2023-11-22 PROBLEM — N95.0 PMB (POSTMENOPAUSAL BLEEDING): Status: RESOLVED | Noted: 2020-08-25 | Resolved: 2023-11-22

## 2023-11-22 PROBLEM — D25.1 FIBROIDS, INTRAMURAL: Status: RESOLVED | Noted: 2020-08-25 | Resolved: 2023-11-22

## 2023-11-22 RX ORDER — MEDROXYPROGESTERONE ACETATE 2.5 MG/1
TABLET ORAL
COMMUNITY
End: 2023-11-22

## 2023-11-22 RX ORDER — LISINOPRIL 10 MG/1
TABLET ORAL EVERY 24 HOURS
COMMUNITY

## 2023-11-22 RX ORDER — ACETAMINOPHEN 160 MG
TABLET,DISINTEGRATING ORAL
COMMUNITY
Start: 2023-10-13

## 2023-11-22 RX ORDER — ESTRADIOL 0.25 MG/.25G
GEL TOPICAL
COMMUNITY

## 2023-11-22 NOTE — PROGRESS NOTES
[facility]   Obstetrics and Gynecology     2023    Patient: Sonja Dove          MR#:[id number]    History of Present Illness    Chief Complaint   Patient presents with    Gynecologic Exam     CC: Ae, last pap 2022 nml HPV neg, mammo 2023. No complaints today.       58 y.o. female  who presents for annual exam.    The patient presents for her regular exam feeling well without complaints.    Relevant data reviewed:    No LMP recorded (lmp unknown). Patient has had a hysterectomy.  Obstetric History:  OB History          2    Para   2    Term   2            AB        Living   2         SAB        IAB        Ectopic        Molar        Multiple        Live Births   2               Menstrual History:     No LMP recorded (lmp unknown). Patient has had a hysterectomy.       Social History     Substance and Sexual Activity   Sexual Activity Not Currently    Partners: Male    Birth control/protection: Post-menopausal     ______________________________________  Patient Active Problem List   Diagnosis    Acquired hypothyroidism    Essential hypertension    Lichen sclerosus of female genitalia     Past Medical History:   Diagnosis Date    Abscess, vulva 2017 periclitoral      Concussion     PT STATED FELL 10 FEET HIT HEAD ON ROCK AT AGE 11    Head trauma in child 1967    AGE 2 HEAD TRAUMA    Rh incompatibility      Past Surgical History:   Procedure Laterality Date    HYSTERECTOMY  2022     Social History     Tobacco Use   Smoking Status Never   Smokeless Tobacco Never     Family History   Problem Relation Age of Onset    Hypertension Father     Hyperlipidemia Father     Basal cell carcinoma Father     Diabetes Mother     Hypertension Mother     Heart defect Mother     Breast cancer Neg Hx     Ovarian cancer Neg Hx     Uterine cancer Neg Hx     Colon cancer Neg Hx     Melanoma Neg Hx     Prostate cancer Neg Hx      Prior to Admission medications   "  Medication Sig Start Date End Date Taking? Authorizing Provider   Cholecalciferol (Vitamin D3) 50 MCG (2000 UT) capsule Oral, Daily, 0 Refill(s) 10/13/23  Yes Peng Weems MD   clobetasol (Temovate) 0.05 % cream Apply 1 application topically to the appropriate area as directed 2 (Two) Times a Day. 7/20/22  Yes Hakan Olsen MD   estradiol (ESTRACE VAGINAL) 0.1 MG/GM vaginal cream Insert 2 g into the vagina Daily. 7/20/22  Yes Hakan Olsen MD   estradiol 0.25 MG/0.25GM gel Estradiol   Yes Peng Weems MD   levothyroxine (SYNTHROID, LEVOTHROID) 88 MCG tablet  11/9/16  Yes Peng Weems MD   lisinopril (PRINIVIL,ZESTRIL) 10 MG tablet Daily.   Yes Peng Weems MD   losartan-hydrochlorothiazide (HYZAAR) 50-12.5 MG per tablet  7/30/21  Yes Emergency, Nurse Jennifer, RN   Magnesium Citrate 83 MG chewable tablet Oral, Daily, 0 Refill(s) 10/13/23  Yes Peng Weems MD   medroxyPROGESTERone (Provera) 2.5 MG tablet Provera  Patient not taking: Reported on 11/22/2023 11/22/23  Peng Weems MD     _______________________________________    Current contraception: status post hysterectomy  History of abnormal Pap smear: no  Family history of uterine or ovarian cancer: no  Family History of colon cancer/colon polyps: no  History of abnormal mammogram: no  History of abnormal lipids: no    The following portions of the patient's history were reviewed and updated as appropriate: allergies, current medications, past medical history, past social history, past surgical history, and problem list.    Review of Systems    Pertinent items are noted in HPI.       Objective   Physical Exam    /74   Ht 162.6 cm (64\")   Wt 90.7 kg (200 lb)   LMP  (LMP Unknown)   BMI 34.33 kg/m²    BP Readings from Last 3 Encounters:   11/22/23 120/74   11/16/22 122/74   09/15/22 112/62      Wt Readings from Last 3 Encounters:   11/22/23 90.7 kg (200 lb)   11/16/22 90.7 kg (200 lb)   09/15/22 89.8 " "kg (198 lb)        BMI: Estimated body mass index is 34.33 kg/m² as calculated from the following:    Height as of this encounter: 162.6 cm (64\").    Weight as of this encounter: 90.7 kg (200 lb).       General: alert, appears stated age, and cooperative   Heart: regular rate and rhythm, S1, S2 normal, no murmur, click, rub or gallop   Lungs: clear to auscultation bilaterally   Abdomen: soft, non-tender, without masses, no organomegaly   Breast: inspection negative, no nipple discharge or bleeding, no masses or nodularity palpable   External genitalia/Vulva: Focal lichen sclerosus on the perineal body, severe atrophy, External genitalia including bartholin's glands, Urethra, [place]'s gland and urethra meatus are normal, Perineum, rectum and anus appear normal , and Bladder appears normal without significant prolapse    Vagina: normal mucosa, normal discharge   Cervix: absent    Uterus: absent    Adnexa: normal adnexa     As part of wellness and prevention, the following topics were discussed with the patient:  Encouraged self breast exam  Physical activity and regular exercised encouraged.         Problem List   Meds  History  Prep for Surg   Imagin}    Assessment:  Diagnoses and all orders for this visit:    1. Well female exam with routine gynecological exam (Primary)  -     Cancel: IGP, Apt HPV,rfx 16 / 18,45    2. Screening for human papillomavirus (HPV)  -     Cancel: IGP, Apt HPV,rfx 16 / 18,45    3. Screening for malignant neoplasm of cervix  -     Cancel: IGP, Apt HPV,rfx 16 / 18,45    4. Lichen sclerosus of female genitalia  Comments:  Moderate to severe - not applying creams regularly    5. Essential hypertension      Plan:  Return in 1 year (on 2024) for Annual exam, 4 month f/u for Lichen sclerosis recheck .    Hakan Olsen MD  2023 11:09 EST  "

## 2024-03-20 ENCOUNTER — OFFICE VISIT (OUTPATIENT)
Dept: OBSTETRICS AND GYNECOLOGY | Age: 59
End: 2024-03-20
Payer: COMMERCIAL

## 2024-03-20 VITALS
HEIGHT: 64 IN | WEIGHT: 198 LBS | DIASTOLIC BLOOD PRESSURE: 74 MMHG | BODY MASS INDEX: 33.8 KG/M2 | SYSTOLIC BLOOD PRESSURE: 130 MMHG

## 2024-03-20 DIAGNOSIS — N90.4 LICHEN SCLEROSUS OF FEMALE GENITALIA: Primary | ICD-10-CM

## 2024-03-20 DIAGNOSIS — I10 ESSENTIAL HYPERTENSION: ICD-10-CM

## 2024-03-20 PROCEDURE — 99213 OFFICE O/P EST LOW 20 MIN: CPT | Performed by: OBSTETRICS & GYNECOLOGY

## 2024-03-20 NOTE — PROGRESS NOTES
[facility]   Obstetrics and Gynecology     3/20/2024      Patient:  Sonja Dove   MR#:[id number]    Office note    Chief Complaint   Patient presents with    Gynecologic Exam     CC: F/up lichen, feeling well       Subjective     History of Present Illness  58 y.o. female  presents for follow-up on lichen sclerosus stating she is feeling well with minimal stable symptoms is adherent to her regular regimen      Relevant data reviewed:       Objective   Patient Active Problem List   Diagnosis    Acquired hypothyroidism    Essential hypertension    Lichen sclerosus of female genitalia       Past Medical History:   Diagnosis Date    Abscess, vulva 2017 periclitoral      Concussion     PT STATED FELL 10 FEET HIT HEAD ON ROCK AT AGE 11    Head trauma in child 1967    AGE 2 HEAD TRAUMA    Rh incompatibility      Past Surgical History:   Procedure Laterality Date    HYSTERECTOMY  2022     Obstetric History:  OB History          2    Para   2    Term   2            AB        Living   2         SAB        IAB        Ectopic        Molar        Multiple        Live Births   2               Menstrual History:     No LMP recorded (lmp unknown). Patient has had a hysterectomy.       # 1 - Date: , Sex: Male, Weight: 3147 g (6 lb 15 oz), GA: 40w0d, Type: Vaginal, Spontaneous, Apgar1: None, Apgar5: None, Living: Living, Birth Comments: None    # 2 - Date: , Sex: Female, Weight: 3629 g (8 lb), GA: 40w0d, Type: Vaginal, Spontaneous, Apgar1: None, Apgar5: None, Living: Living, Birth Comments: None    Family History   Problem Relation Age of Onset    Hypertension Father     Hyperlipidemia Father     Basal cell carcinoma Father     Diabetes Mother     Hypertension Mother     Heart defect Mother     Breast cancer Neg Hx     Ovarian cancer Neg Hx     Uterine cancer Neg Hx     Colon cancer Neg Hx     Melanoma Neg Hx     Prostate cancer Neg Hx      Social History  "    Tobacco Use    Smoking status: Never    Smokeless tobacco: Never   Vaping Use    Vaping status: Never Used   Substance Use Topics    Alcohol use: No    Drug use: No     Patient has no known allergies.    Current Outpatient Medications:     Cholecalciferol (Vitamin D3) 50 MCG (2000 UT) capsule, Oral, Daily, 0 Refill(s), Disp: , Rfl:     clobetasol (Temovate) 0.05 % cream, Apply 1 application topically to the appropriate area as directed 2 (Two) Times a Day., Disp: 60 g, Rfl: 4    estradiol (ESTRACE VAGINAL) 0.1 MG/GM vaginal cream, Insert 2 g into the vagina Daily., Disp: 42.5 g, Rfl: 12    estradiol 0.25 MG/0.25GM gel, Estradiol, Disp: , Rfl:     levothyroxine (SYNTHROID, LEVOTHROID) 88 MCG tablet, , Disp: , Rfl:     losartan-hydrochlorothiazide (HYZAAR) 50-12.5 MG per tablet, , Disp: , Rfl:     Magnesium Citrate 83 MG chewable tablet, Oral, Daily, 0 Refill(s), Disp: , Rfl:     lisinopril (PRINIVIL,ZESTRIL) 10 MG tablet, Daily., Disp: , Rfl:     The following portions of the patient's history were reviewed and updated as appropriate: allergies, current medications, past family history, past medical history, past social history, past surgical history, and problem list.    Review of Systems   Constitutional: Negative.    Respiratory: Negative.     Cardiovascular: Negative.    Gastrointestinal: Negative.    Genitourinary: Negative.    Psychiatric/Behavioral: Negative.         BP Readings from Last 3 Encounters:   03/20/24 130/74   11/22/23 120/74   11/16/22 122/74      Wt Readings from Last 3 Encounters:   03/20/24 89.8 kg (198 lb)   11/22/23 90.7 kg (200 lb)   11/16/22 90.7 kg (200 lb)      BMI: Estimated body mass index is 33.99 kg/m² as calculated from the following:    Height as of this encounter: 162.6 cm (64\").    Weight as of this encounter: 89.8 kg (198 lb). BSA: Estimated body surface area is 1.95 meters squared as calculated from the following:    Height as of this encounter: 162.6 cm (64\").    Weight as " of this encounter: 89.8 kg (198 lb).    Physical Exam  Vitals and nursing note reviewed.   Constitutional:       Appearance: Normal appearance. She is well-developed.   HENT:      Head: Normocephalic and atraumatic.      Nose: Nose normal.   Eyes:      Pupils: Pupils are equal, round, and reactive to light.   Neck:      Thyroid: No thyromegaly.      Trachea: No tracheal deviation.   Cardiovascular:      Rate and Rhythm: Normal rate and regular rhythm.      Heart sounds: Normal heart sounds.   Pulmonary:      Effort: Pulmonary effort is normal.      Breath sounds: Normal breath sounds.   Chest:      Chest wall: No mass.   Breasts:     Right: No mass, nipple discharge or skin change.      Left: No mass, nipple discharge or skin change.   Abdominal:      General: Bowel sounds are normal.      Palpations: Abdomen is soft. There is no mass.      Tenderness: There is no abdominal tenderness. There is no rebound.      Hernia: No hernia is present. There is no hernia in the left inguinal area.   Genitourinary:     Labia:         Right: No rash or lesion.         Left: No rash or lesion.       Vagina: Normal.      Comments: Mild minimal atrophy    Markedly improved previous extensive lichen sclerosus  Musculoskeletal:         General: Normal range of motion.      Cervical back: Normal range of motion and neck supple.   Skin:     General: Skin is warm.      Findings: No rash.   Neurological:      Mental Status: She is alert and oriented to person, place, and time.      Deep Tendon Reflexes: Reflexes are normal and symmetric.   Psychiatric:         Behavior: Behavior normal.         Thought Content: Thought content normal.         Judgment: Judgment normal.            Assessment & Plan   Diagnoses and all orders for this visit:    1. Essential hypertension (Primary)  Comments:  stable on current medications    2. Lichen sclerosus of female genitalia      Plan:   Continue regimen of Temovate alternating with topical  estrogen.      No follow-ups on file.           Hakan Olsen MD   3/20/2024 11:50 EDT

## 2024-08-03 DIAGNOSIS — N90.5 ATROPHIC VULVA: ICD-10-CM

## 2024-08-03 DIAGNOSIS — N90.4 LICHEN SCLEROSUS OF FEMALE GENITALIA: ICD-10-CM

## 2024-08-03 RX ORDER — ESTRADIOL 0.1 MG/G
2 CREAM VAGINAL DAILY
Qty: 42.5 G | Refills: 12 | Status: SHIPPED | OUTPATIENT
Start: 2024-08-03

## 2024-08-03 RX ORDER — CLOBETASOL PROPIONATE 0.5 MG/G
1 CREAM TOPICAL 2 TIMES DAILY
Qty: 60 G | Refills: 4 | Status: SHIPPED | OUTPATIENT
Start: 2024-08-03

## 2024-12-04 ENCOUNTER — OFFICE VISIT (OUTPATIENT)
Dept: OBSTETRICS AND GYNECOLOGY | Age: 59
End: 2024-12-04
Payer: COMMERCIAL

## 2024-12-04 VITALS
SYSTOLIC BLOOD PRESSURE: 118 MMHG | HEIGHT: 64 IN | DIASTOLIC BLOOD PRESSURE: 78 MMHG | BODY MASS INDEX: 29.02 KG/M2 | WEIGHT: 170 LBS

## 2024-12-04 DIAGNOSIS — E03.9 ACQUIRED HYPOTHYROIDISM: ICD-10-CM

## 2024-12-04 DIAGNOSIS — Z12.31 SCREENING MAMMOGRAM FOR BREAST CANCER: ICD-10-CM

## 2024-12-04 DIAGNOSIS — N90.4 LICHEN SCLEROSUS OF FEMALE GENITALIA: ICD-10-CM

## 2024-12-04 DIAGNOSIS — Z01.419 WELL FEMALE EXAM WITH ROUTINE GYNECOLOGICAL EXAM: Primary | ICD-10-CM

## 2024-12-04 DIAGNOSIS — I10 ESSENTIAL HYPERTENSION: ICD-10-CM

## 2024-12-04 DIAGNOSIS — Z13.89 SCREENING FOR BLOOD OR PROTEIN IN URINE: ICD-10-CM

## 2024-12-04 LAB
BILIRUB BLD-MCNC: NEGATIVE MG/DL
CLARITY, POC: CLEAR
COLOR UR: YELLOW
GLUCOSE UR STRIP-MCNC: NEGATIVE MG/DL
KETONES UR QL: NEGATIVE
LEUKOCYTE EST, POC: NEGATIVE
NITRITE UR-MCNC: NEGATIVE MG/ML
PH UR: 6 [PH] (ref 5–8)
PROT UR STRIP-MCNC: NEGATIVE MG/DL
RBC # UR STRIP: NEGATIVE /UL
SP GR UR: 1.02 (ref 1–1.03)
UROBILINOGEN UR QL: NORMAL

## 2024-12-04 NOTE — PROGRESS NOTES
[facility]   Obstetrics and Gynecology     2024    Patient: Sonja Dove          MR#:[id number]    History of Present Illness    Chief Complaint   Patient presents with    Gynecologic Exam     CC:Annual, last pap 22 neg, HPV neg, Mammo 24 normal, HYSTERECTOMY       59 y.o. female  who presents for annual exam.    The patient had a fairly severe case of lichen sclerosis that is responded nicely to topical therapy.  The patient reports improved symptoms and planned 30 pound weight loss    Relevant data reviewed:    No LMP recorded (lmp unknown). Patient has had a hysterectomy.  Obstetric History:  OB History          2    Para   2    Term   2            AB        Living   2         SAB        IAB        Ectopic        Molar        Multiple        Live Births   2               Menstrual History:     No LMP recorded (lmp unknown). Patient has had a hysterectomy.       Social History     Substance and Sexual Activity   Sexual Activity Yes    Partners: Male    Birth control/protection: Post-menopausal     ______________________________________  Patient Active Problem List   Diagnosis    Acquired hypothyroidism    Essential hypertension    Lichen sclerosus of female genitalia     Past Medical History:   Diagnosis Date    Abscess, vulva 2017 periclitoral      Concussion     PT STATED FELL 10 FEET HIT HEAD ON ROCK AT AGE 11    Disease of thyroid gland     Head trauma in child 1967    AGE 2 HEAD TRAUMA    Multiple gestation 89    PONV (postoperative nausea and vomiting) [id number]    Rh incompatibility      Past Surgical History:   Procedure Laterality Date    HYSTERECTOMY  2022    MYOMECTOMY ABDOMINAL APPROACH  22     Social History     Tobacco Use   Smoking Status Never   Smokeless Tobacco Never     Family History   Problem Relation Age of Onset    Hypertension Father     Hyperlipidemia Father     Basal cell carcinoma Father      "Diabetes Mother     Hypertension Mother     Heart defect Mother     Breast cancer Neg Hx     Ovarian cancer Neg Hx     Uterine cancer Neg Hx     Colon cancer Neg Hx     Melanoma Neg Hx     Prostate cancer Neg Hx      Prior to Admission medications    Medication Sig Start Date End Date Taking? Authorizing Provider   Cholecalciferol (Vitamin D3) 50 MCG (2000 UT) capsule Oral, Daily, 0 Refill(s) 10/13/23  Yes Peng Weems MD   clobetasol propionate (Temovate) 0.05 % cream Apply 1 Application topically to the appropriate area as directed 2 (Two) Times a Day. 8/3/24  Yes Hakan Olsen MD   estradiol (ESTRACE VAGINAL) 0.1 MG/GM vaginal cream Insert 2 g into the vagina Daily. 8/3/24  Yes Hakan Olsen MD   levothyroxine (SYNTHROID, LEVOTHROID) 88 MCG tablet  11/9/16  Yes Peng Weems MD   losartan-hydrochlorothiazide (HYZAAR) 50-12.5 MG per tablet  7/30/21  Yes Emergency, Nurse Epic, RN   Magnesium Citrate 83 MG chewable tablet Oral, Daily, 0 Refill(s) 10/13/23  Yes Peng Weems MD   estradiol 0.25 MG/0.25GM gel Estradiol  Patient not taking: Reported on 12/4/2024 12/4/24  Peng Weems MD     _______________________________________    Current contraception: status post hysterectomy  History of abnormal Pap smear: no  Family history of uterine or ovarian cancer: no  Family History of colon cancer/colon polyps: no  History of abnormal mammogram: no  History of abnormal lipids: no    The following portions of the patient's history were reviewed and updated as appropriate: allergies, current medications, past family history, past medical history, past social history, past surgical history, and problem list.    Review of Systems    Pertinent items are noted in HPI.       Objective   Physical Exam    /78   Ht 162.6 cm (64\")   Wt 77.1 kg (170 lb)   LMP  (LMP Unknown)   BMI 29.18 kg/m²    BP Readings from Last 3 Encounters:   12/04/24 118/78   03/20/24 130/74   11/22/23 120/74    " "  Wt Readings from Last 3 Encounters:   24 77.1 kg (170 lb)   24 89.8 kg (198 lb)   23 90.7 kg (200 lb)        BMI: Estimated body mass index is 29.18 kg/m² as calculated from the following:    Height as of this encounter: 162.6 cm (64\").    Weight as of this encounter: 77.1 kg (170 lb).       General: alert, appears stated age, and cooperative   Heart: regular rate and rhythm, S1, S2 normal, no murmur, click, rub or gallop   Lungs: clear to auscultation bilaterally   Abdomen: soft, non-tender, without masses, no organomegaly   Breast: inspection negative, no nipple discharge or bleeding, no masses or nodularity palpable   External genitalia/Vulva: mild atrophy, External genitalia including bartholin's glands, Urethra, [place]'s gland and urethra meatus are normal, Perineum, rectum and anus appear normal , and Bladder appears normal without significant prolapse    Vagina: normal mucosa, normal discharge   Cervix: absent    Uterus: absent    Adnexa: normal adnexa     As part of wellness and prevention, the following topics were discussed with the patient:  Encouraged self breast exam  Physical activity and regular exercised encouraged.         Problem List   Meds  History  Prep for Surg   Imagin}    Assessment:  Diagnoses and all orders for this visit:    1. Well female exam with routine gynecological exam (Primary)    2. Screening mammogram for breast cancer  -     Mammo Screening Digital Tomosynthesis Bilateral With CAD; Future    3. Screening for blood or protein in urine  -     POC Urinalysis Dipstick    4. Lichen sclerosus of female genitalia  Comments:  Excellent response to topicals    5. Essential hypertension    6. Acquired hypothyroidism      Plan:  Return in 1 year (on 2025) for Annual exam.    Hakan Olsen MD  2024 12:22 EST  "

## 2025-04-09 ENCOUNTER — OFFICE VISIT (OUTPATIENT)
Dept: OBSTETRICS AND GYNECOLOGY | Age: 60
End: 2025-04-09
Payer: COMMERCIAL

## 2025-04-09 VITALS
HEIGHT: 64 IN | WEIGHT: 174 LBS | SYSTOLIC BLOOD PRESSURE: 118 MMHG | BODY MASS INDEX: 29.71 KG/M2 | DIASTOLIC BLOOD PRESSURE: 76 MMHG

## 2025-04-09 DIAGNOSIS — Z13.89 SCREENING FOR BLOOD OR PROTEIN IN URINE: ICD-10-CM

## 2025-04-09 DIAGNOSIS — N90.89 CLITORAL IRRITATION: Primary | ICD-10-CM

## 2025-04-09 LAB
BILIRUB BLD-MCNC: NEGATIVE MG/DL
CLARITY, POC: CLEAR
COLOR UR: YELLOW
GLUCOSE UR STRIP-MCNC: NEGATIVE MG/DL
KETONES UR QL: NEGATIVE
LEUKOCYTE EST, POC: NEGATIVE
NITRITE UR-MCNC: NEGATIVE MG/ML
PH UR: 6.5 [PH] (ref 5–8)
PROT UR STRIP-MCNC: NEGATIVE MG/DL
RBC # UR STRIP: NEGATIVE /UL
SP GR UR: 1.02 (ref 1–1.03)
UROBILINOGEN UR QL: NORMAL

## 2025-04-09 PROCEDURE — 99213 OFFICE O/P EST LOW 20 MIN: CPT | Performed by: OBSTETRICS & GYNECOLOGY

## 2025-04-09 PROCEDURE — 81002 URINALYSIS NONAUTO W/O SCOPE: CPT | Performed by: OBSTETRICS & GYNECOLOGY

## 2025-04-16 ENCOUNTER — OFFICE VISIT (OUTPATIENT)
Dept: OBSTETRICS AND GYNECOLOGY | Age: 60
End: 2025-04-16
Payer: COMMERCIAL

## 2025-04-16 VITALS
SYSTOLIC BLOOD PRESSURE: 114 MMHG | WEIGHT: 172 LBS | HEIGHT: 64 IN | BODY MASS INDEX: 29.37 KG/M2 | DIASTOLIC BLOOD PRESSURE: 68 MMHG

## 2025-04-16 DIAGNOSIS — B37.31 VAGINAL YEAST INFECTION: ICD-10-CM

## 2025-04-16 DIAGNOSIS — Z13.89 SCREENING FOR BLOOD OR PROTEIN IN URINE: ICD-10-CM

## 2025-04-16 DIAGNOSIS — N90.89 CLITORAL IRRITATION: Primary | ICD-10-CM

## 2025-04-16 PROCEDURE — 81002 URINALYSIS NONAUTO W/O SCOPE: CPT | Performed by: OBSTETRICS & GYNECOLOGY

## 2025-04-16 PROCEDURE — 99213 OFFICE O/P EST LOW 20 MIN: CPT | Performed by: OBSTETRICS & GYNECOLOGY

## 2025-04-16 RX ORDER — FLUCONAZOLE 200 MG/1
200 TABLET ORAL EVERY OTHER DAY
Qty: 4 TABLET | Refills: 1 | Status: SHIPPED | OUTPATIENT
Start: 2025-04-16

## 2025-04-16 NOTE — PROGRESS NOTES
[facility]   Obstetrics and Gynecology     2025      Patient:  Sonja Dove   MR#:[id number]    Office note    Chief Complaint   Patient presents with    Gynecologic Exam     GYN, F/U  sore on vagina   last pap 22 neg, HPV neg, Mammo 24 normal, HYSTERECTOMY       Subjective     History of Present Illness  59 y.o. female  presents for follow-up on periclitoral swelling and pain reporting moderate purulent drainage from the area this past weekend and then near resolution of her symptoms by Monday.  She reports marked improvement from her previous visit.      Relevant data reviewed:       Objective   Patient Active Problem List   Diagnosis    Acquired hypothyroidism    Essential hypertension    Lichen sclerosus of female genitalia       Past Medical History:   Diagnosis Date    Abscess, vulva 2017 periclitoral      Concussion     PT STATED FELL 10 FEET HIT HEAD ON [place] AT AGE 11    Disease of thyroid gland     Head trauma in child 1967    AGE 2 HEAD TRAUMA    Multiple gestation 89    PONV (postoperative nausea and vomiting) [id number]    Rh incompatibility      Past Surgical History:   Procedure Laterality Date    HYSTERECTOMY  2022    MYOMECTOMY ABDOMINAL APPROACH  22     Obstetric History:  OB History          2    Para   2    Term   2            AB        Living   2         SAB        IAB        Ectopic        Molar        Multiple        Live Births   2               Menstrual History:     No LMP recorded (lmp unknown). Patient has had a hysterectomy.       # 1 - Date: , Sex: Male, Weight: 3147 g (6 lb 15 oz), GA: 40w0d, Type: Vaginal, Spontaneous, Apgar1: None, Apgar5: None, Living: Living, Birth Comments: None    # 2 - Date: , Sex: Female, Weight: 3629 g (8 lb), GA: 40w0d, Type: Vaginal, Spontaneous, Apgar1: None, Apgar5: None, Living: Living, Birth Comments: None    Family History   Problem Relation Age of Onset     Hypertension Father     Hyperlipidemia Father     Basal cell carcinoma Father     Diabetes Mother     Hypertension Mother     Heart defect Mother     Breast cancer Neg Hx     Ovarian cancer Neg Hx     Uterine cancer Neg Hx     Colon cancer Neg Hx     Melanoma Neg Hx     Prostate cancer Neg Hx      Social History     Tobacco Use    Smoking status: Never    Smokeless tobacco: Never   Vaping Use    Vaping status: Never Used   Substance Use Topics    Alcohol use: Yes     Alcohol/week: 3.0 standard drinks of alcohol     Types: 3 Drinks containing 0.5 oz of alcohol per week    Drug use: No     Patient has no known allergies.    Current Outpatient Medications:     Cholecalciferol (Vitamin D3) 50 MCG (2000 UT) capsule, Oral, Daily, 0 Refill(s), Disp: , Rfl:     clobetasol propionate (Temovate) 0.05 % cream, Apply 1 Application topically to the appropriate area as directed 2 (Two) Times a Day., Disp: 60 g, Rfl: 4    estradiol (ESTRACE VAGINAL) 0.1 MG/GM vaginal cream, Insert 2 g into the vagina Daily., Disp: 42.5 g, Rfl: 12    levothyroxine (SYNTHROID, LEVOTHROID) 88 MCG tablet, , Disp: , Rfl:     losartan-hydrochlorothiazide (HYZAAR) 50-12.5 MG per tablet, , Disp: , Rfl:     Magnesium Citrate 83 MG chewable tablet, , Disp: , Rfl:     The following portions of the patient's history were reviewed and updated as appropriate: allergies, current medications, past family history, past medical history, past social history, past surgical history, and problem list.    Review of Systems   Constitutional: Negative.    Respiratory: Negative.     Cardiovascular: Negative.    Gastrointestinal: Negative.    Genitourinary: Negative.    Psychiatric/Behavioral: Negative.         BP Readings from Last 3 Encounters:   04/16/25 114/68   04/09/25 118/76   12/04/24 118/78      Wt Readings from Last 3 Encounters:   04/16/25 78 kg (172 lb)   04/09/25 78.9 kg (174 lb)   12/04/24 77.1 kg (170 lb)      BMI: Estimated body mass index is 29.52 kg/m² as  "calculated from the following:    Height as of this encounter: 162.6 cm (64\").    Weight as of this encounter: 78 kg (172 lb). BSA: Estimated body surface area is 1.83 meters squared as calculated from the following:    Height as of this encounter: 162.6 cm (64\").    Weight as of this encounter: 78 kg (172 lb).    Physical Exam  Constitutional:       General: She is not in acute distress.  Pulmonary:      Effort: Pulmonary effort is normal.   Abdominal:      Palpations: Abdomen is soft.      Tenderness: There is no abdominal tenderness.   Genitourinary:     Comments: Normal appearing external genitalia with markedly reduced swelling  Skin:     General: Skin is warm.   Neurological:      Mental Status: She is alert.   Psychiatric:         Mood and Affect: Mood normal.         Thought Content: Thought content normal.         Judgment: Judgment normal.               Clitoral irritation  Marked improvement        Screening for blood or protein in urine    Orders:    POC Urinalysis Dipstick    Vaginal yeast infection    Orders:    fluconazole (Diflucan) 200 MG tablet; Take 1 tablet by mouth Every Other Day.      No follow-ups on file.    Hakan Olsen MD   4/16/2025 11:09 EDT  "

## 2025-07-30 ENCOUNTER — OFFICE VISIT (OUTPATIENT)
Dept: OBSTETRICS AND GYNECOLOGY | Age: 60
End: 2025-07-30
Payer: COMMERCIAL

## 2025-07-30 VITALS
SYSTOLIC BLOOD PRESSURE: 116 MMHG | HEIGHT: 64 IN | BODY MASS INDEX: 29.43 KG/M2 | WEIGHT: 172.4 LBS | DIASTOLIC BLOOD PRESSURE: 70 MMHG

## 2025-07-30 DIAGNOSIS — N90.89 CLITORAL IRRITATION: Primary | ICD-10-CM

## 2025-07-30 DIAGNOSIS — Z13.89 SCREENING FOR HEMATURIA OR PROTEINURIA: ICD-10-CM

## 2025-07-30 LAB
BILIRUB BLD-MCNC: NEGATIVE MG/DL
CLARITY, POC: CLEAR
COLOR UR: YELLOW
GLUCOSE UR STRIP-MCNC: NEGATIVE MG/DL
KETONES UR QL: NEGATIVE
LEUKOCYTE EST, POC: NEGATIVE
NITRITE UR-MCNC: NEGATIVE MG/ML
PH UR: 5.5 [PH] (ref 5–8)
PROT UR STRIP-MCNC: ABNORMAL MG/DL
RBC # UR STRIP: NEGATIVE /UL
SP GR UR: 1.02 (ref 1–1.03)
UROBILINOGEN UR QL: ABNORMAL

## 2025-07-30 PROCEDURE — 81002 URINALYSIS NONAUTO W/O SCOPE: CPT | Performed by: OBSTETRICS & GYNECOLOGY

## 2025-07-30 PROCEDURE — 99213 OFFICE O/P EST LOW 20 MIN: CPT | Performed by: OBSTETRICS & GYNECOLOGY

## 2025-07-30 RX ORDER — DOXYCYCLINE 100 MG/1
100 CAPSULE ORAL 2 TIMES DAILY
Qty: 14 CAPSULE | Refills: 0 | Status: SHIPPED | OUTPATIENT
Start: 2025-07-30

## 2025-07-30 RX ORDER — NEOMYCIN SULFATE, POLYMYXIN B SULFATE AND DEXAMETHASONE 3.5; 10000; 1 MG/ML; [USP'U]/ML; MG/ML
SUSPENSION/ DROPS OPHTHALMIC
COMMUNITY
Start: 2025-06-08

## 2025-07-30 NOTE — PROGRESS NOTES
[facility]   Obstetrics and Gynecology     2025      Patient:  Sonja Dove   MR#:[id number]    Office note    Chief Complaint   Patient presents with    Follow-up     Gyn- cyst on right side of labia         Subjective     History of Present Illness  59 y.o. female  presents with complaint of worsening periclitoral lesion with swelling and discomfort.  The patient's had the same thing previously that resolved with tub soaks.  The patient is been doing aggressive tub soaks and the area is a little improved today but there is a fairly impressive periclitoral abscess without significant cellulitis.    Relevant data reviewed:       Objective   Patient Active Problem List   Diagnosis    Acquired hypothyroidism    Essential hypertension    Lichen sclerosus of female genitalia       Past Medical History:   Diagnosis Date    Abscess, vulva 2017 periclitoral      Concussion     PT STATED FELL 10 FEET HIT HEAD ON R[place] AT AGE 11    Disease of thyroid gland     Head trauma in child 1967    AGE 2 HEAD TRAUMA    Multiple gestation 89    PONV (postoperative nausea and vomiting) [id number]    Rh incompatibility      Past Surgical History:   Procedure Laterality Date    HYSTERECTOMY  2022    MYOMECTOMY ABDOMINAL APPROACH  22     Obstetric History:  OB History          2    Para   2    Term   2            AB        Living   2         SAB        IAB        Ectopic        Molar        Multiple        Live Births   2               Menstrual History:     No LMP recorded (lmp unknown). Patient has had a hysterectomy.       # 1 - Date: , Sex: Male, Weight: 3147 g (6 lb 15 oz), GA: 40w0d, Type: Vaginal, Spontaneous, Apgar1: None, Apgar5: None, Living: Living, Birth Comments: None    # 2 - Date: , Sex: Female, Weight: 3629 g (8 lb), GA: 40w0d, Type: Vaginal, Spontaneous, Apgar1: None, Apgar5: None, Living: Living, Birth Comments: None    Family History    Problem Relation Age of Onset    Hypertension Father     Hyperlipidemia Father     Basal cell carcinoma Father     Diabetes Mother     Hypertension Mother     Heart defect Mother     Breast cancer Neg Hx     Ovarian cancer Neg Hx     Uterine cancer Neg Hx     Colon cancer Neg Hx     Melanoma Neg Hx     Prostate cancer Neg Hx      Social History     Tobacco Use    Smoking status: Never    Smokeless tobacco: Never   Vaping Use    Vaping status: Never Used   Substance Use Topics    Alcohol use: Yes     Alcohol/week: 3.0 standard drinks of alcohol     Types: 3 Drinks containing 0.5 oz of alcohol per week    Drug use: No     Patient has no known allergies.    Current Outpatient Medications:     Cholecalciferol (Vitamin D3) 50 MCG (2000 UT) capsule, Oral, Daily, 0 Refill(s), Disp: , Rfl:     clobetasol propionate (Temovate) 0.05 % cream, Apply 1 Application topically to the appropriate area as directed 2 (Two) Times a Day., Disp: 60 g, Rfl: 4    estradiol (ESTRACE VAGINAL) 0.1 MG/GM vaginal cream, Insert 2 g into the vagina Daily., Disp: 42.5 g, Rfl: 12    levothyroxine (SYNTHROID, LEVOTHROID) 88 MCG tablet, , Disp: , Rfl:     losartan-hydrochlorothiazide (HYZAAR) 50-12.5 MG per tablet, , Disp: , Rfl:     Magnesium Citrate 83 MG chewable tablet, , Disp: , Rfl:     fluconazole (Diflucan) 200 MG tablet, Take 1 tablet by mouth Every Other Day., Disp: 4 tablet, Rfl: 1    neomycin-polymyxin-dexamethasone (MAXITROL) 3.5-66672-6.1 ophthalmic suspension, , Disp: , Rfl:     The following portions of the patient's history were reviewed and updated as appropriate: allergies, current medications, past family history, past medical history, past social history, past surgical history, and problem list.    Review of Systems   Genitourinary:  Positive for genital sores.       BP Readings from Last 3 Encounters:   07/30/25 116/70   04/16/25 114/68   04/09/25 118/76      Wt Readings from Last 3 Encounters:   07/30/25 78.2 kg (172 lb 6.4  "oz)   04/16/25 78 kg (172 lb)   04/09/25 78.9 kg (174 lb)      BMI: Estimated body mass index is 29.59 kg/m² as calculated from the following:    Height as of this encounter: 162.6 cm (64\").    Weight as of this encounter: 78.2 kg (172 lb 6.4 oz). BSA: Estimated body surface area is 1.84 meters squared as calculated from the following:    Height as of this encounter: 162.6 cm (64\").    Weight as of this encounter: 78.2 kg (172 lb 6.4 oz).    Physical Exam  Constitutional:       General: She is not in acute distress.  Pulmonary:      Effort: Pulmonary effort is normal.   Abdominal:      Palpations: Abdomen is soft.      Tenderness: There is no abdominal tenderness.   Genitourinary:      Skin:     General: Skin is warm.   Neurological:      Mental Status: She is alert.   Psychiatric:         Mood and Affect: Mood normal.         Thought Content: Thought content normal.         Judgment: Judgment normal.       Area cleaned very well with Hibiclens and injected with 4 cc of 1% lidocaine focal area of cellulitis/abscess is incised with an 11 blade with minimal drainage,            Clitoral irritation  Screening for hematuria or proteinuria      Orders:    POC Urinalysis Dipstick    doxycycline (VIBRAMYCIN) 100 MG capsule; Take 1 capsule by mouth 2 (Two) Times a Day.      No follow-ups on file.    Hakan Olsen MD   7/30/2025 09:21 EDT  "

## 2025-08-04 ENCOUNTER — OFFICE VISIT (OUTPATIENT)
Dept: OBSTETRICS AND GYNECOLOGY | Age: 60
End: 2025-08-04
Payer: COMMERCIAL

## 2025-08-04 VITALS
BODY MASS INDEX: 29.02 KG/M2 | WEIGHT: 170 LBS | DIASTOLIC BLOOD PRESSURE: 72 MMHG | HEIGHT: 64 IN | SYSTOLIC BLOOD PRESSURE: 116 MMHG

## 2025-08-04 DIAGNOSIS — N90.89 CLITORAL IRRITATION: Primary | ICD-10-CM

## 2025-08-04 DIAGNOSIS — Z13.89 SCREENING FOR HEMATURIA OR PROTEINURIA: ICD-10-CM

## 2025-08-04 LAB
BILIRUB BLD-MCNC: NEGATIVE MG/DL
CLARITY, POC: CLEAR
COLOR UR: YELLOW
GLUCOSE UR STRIP-MCNC: NEGATIVE MG/DL
KETONES UR QL: NEGATIVE
LEUKOCYTE EST, POC: NEGATIVE
NITRITE UR-MCNC: NEGATIVE MG/ML
PH UR: 5.5 [PH] (ref 5–8)
PROT UR STRIP-MCNC: NEGATIVE MG/DL
RBC # UR STRIP: NEGATIVE /UL
SP GR UR: 1.02 (ref 1–1.03)
UROBILINOGEN UR QL: NORMAL

## 2025-08-04 PROCEDURE — 99213 OFFICE O/P EST LOW 20 MIN: CPT | Performed by: OBSTETRICS & GYNECOLOGY

## 2025-08-04 PROCEDURE — 81002 URINALYSIS NONAUTO W/O SCOPE: CPT | Performed by: OBSTETRICS & GYNECOLOGY

## 2025-08-04 RX ORDER — LEVOTHYROXINE SODIUM 88 UG/1
88 TABLET ORAL DAILY
COMMUNITY
End: 2025-08-04

## 2025-08-04 RX ORDER — NEOMYCIN POLYMYXIN B SULFATES AND DEXAMETHASONE 3.5; 10000; 1 MG/ML; [USP'U]/ML; MG/ML
SUSPENSION/ DROPS OPHTHALMIC EVERY 6 HOURS SCHEDULED
COMMUNITY
Start: 2025-06-08

## 2025-08-04 RX ORDER — LISINOPRIL 10 MG/1
10 TABLET ORAL DAILY
COMMUNITY
End: 2025-08-04